# Patient Record
Sex: MALE | Race: WHITE | NOT HISPANIC OR LATINO | Employment: FULL TIME | URBAN - METROPOLITAN AREA
[De-identification: names, ages, dates, MRNs, and addresses within clinical notes are randomized per-mention and may not be internally consistent; named-entity substitution may affect disease eponyms.]

---

## 2018-05-20 ENCOUNTER — PATIENT MESSAGE (OUTPATIENT)
Dept: FAMILY MEDICINE | Facility: CLINIC | Age: 38
End: 2018-05-20

## 2018-05-21 ENCOUNTER — OFFICE VISIT (OUTPATIENT)
Dept: URGENT CARE | Facility: CLINIC | Age: 38
End: 2018-05-21
Payer: COMMERCIAL

## 2018-05-21 ENCOUNTER — PATIENT MESSAGE (OUTPATIENT)
Dept: FAMILY MEDICINE | Facility: CLINIC | Age: 38
End: 2018-05-21

## 2018-05-21 VITALS
WEIGHT: 220 LBS | BODY MASS INDEX: 31.5 KG/M2 | TEMPERATURE: 98 F | HEIGHT: 70 IN | SYSTOLIC BLOOD PRESSURE: 121 MMHG | DIASTOLIC BLOOD PRESSURE: 78 MMHG | HEART RATE: 78 BPM

## 2018-05-21 DIAGNOSIS — J02.9 SORE THROAT: ICD-10-CM

## 2018-05-21 DIAGNOSIS — J06.9 UPPER RESPIRATORY TRACT INFECTION, UNSPECIFIED TYPE: Primary | ICD-10-CM

## 2018-05-21 LAB
CTP QC/QA: YES
S PYO RRNA THROAT QL PROBE: NEGATIVE

## 2018-05-21 PROCEDURE — 3008F BODY MASS INDEX DOCD: CPT | Mod: CPTII,S$GLB,, | Performed by: NURSE PRACTITIONER

## 2018-05-21 PROCEDURE — 96372 THER/PROPH/DIAG INJ SC/IM: CPT | Mod: S$GLB,,, | Performed by: EMERGENCY MEDICINE

## 2018-05-21 PROCEDURE — 87880 STREP A ASSAY W/OPTIC: CPT | Mod: QW,S$GLB,, | Performed by: NURSE PRACTITIONER

## 2018-05-21 PROCEDURE — 99203 OFFICE O/P NEW LOW 30 MIN: CPT | Mod: 25,S$GLB,, | Performed by: NURSE PRACTITIONER

## 2018-05-21 RX ORDER — PROMETHAZINE HYDROCHLORIDE AND CODEINE PHOSPHATE 6.25; 1 MG/5ML; MG/5ML
5 SOLUTION ORAL EVERY 6 HOURS PRN
Qty: 120 ML | Refills: 0 | Status: SHIPPED | OUTPATIENT
Start: 2018-05-21 | End: 2018-05-31

## 2018-05-21 RX ORDER — BETAMETHASONE SODIUM PHOSPHATE AND BETAMETHASONE ACETATE 3; 3 MG/ML; MG/ML
6 INJECTION, SUSPENSION INTRA-ARTICULAR; INTRALESIONAL; INTRAMUSCULAR; SOFT TISSUE
Status: COMPLETED | OUTPATIENT
Start: 2018-05-21 | End: 2018-05-21

## 2018-05-21 RX ADMIN — BETAMETHASONE SODIUM PHOSPHATE AND BETAMETHASONE ACETATE 6 MG: 3; 3 INJECTION, SUSPENSION INTRA-ARTICULAR; INTRALESIONAL; INTRAMUSCULAR; SOFT TISSUE at 12:05

## 2018-05-21 NOTE — PROGRESS NOTES
"Subjective:       Patient ID: Juan Noel is a 37 y.o. male.    Vitals:  height is 5' 10" (1.778 m) and weight is 99.8 kg (220 lb). His temperature is 98.4 °F (36.9 °C). His blood pressure is 121/78 and his pulse is 78.     Chief Complaint: Sore Throat (started 4 days ago)    Started 4 days ago with sore throat and productive cough      Sore Throat    This is a new problem. The current episode started in the past 7 days. The problem has been gradually worsening. Neither side of throat is experiencing more pain than the other. There has been no fever. The pain is at a severity of 2/10. Associated symptoms include congestion, coughing, ear pain and headaches. Pertinent negatives include no abdominal pain, hoarse voice or shortness of breath. Treatments tried: expectorant. The treatment provided mild relief.     Review of Systems   Constitution: Positive for malaise/fatigue. Negative for chills and fever.   HENT: Positive for congestion, ear pain and sore throat. Negative for hoarse voice.    Eyes: Negative for discharge and redness.   Cardiovascular: Negative for chest pain, dyspnea on exertion and leg swelling.   Respiratory: Positive for cough and sputum production. Negative for shortness of breath and wheezing.         Yellow sputum   Musculoskeletal: Positive for myalgias.   Gastrointestinal: Negative for abdominal pain and nausea.   Neurological: Positive for headaches.       Objective:      Physical Exam   Constitutional: He is oriented to person, place, and time. He appears well-developed and well-nourished. He is cooperative.  Non-toxic appearance. He does not appear ill. No distress.   HENT:   Head: Normocephalic and atraumatic.   Right Ear: Hearing, tympanic membrane, external ear and ear canal normal.   Left Ear: Hearing, tympanic membrane, external ear and ear canal normal.   Nose: Nose normal. No mucosal edema, rhinorrhea or nasal deformity. No epistaxis. Right sinus exhibits no maxillary " sinus tenderness and no frontal sinus tenderness. Left sinus exhibits no maxillary sinus tenderness and no frontal sinus tenderness.   Mouth/Throat: Uvula is midline, oropharynx is clear and moist and mucous membranes are normal. No trismus in the jaw. Normal dentition. No uvula swelling. No posterior oropharyngeal erythema.   Eyes: Conjunctivae and lids are normal. No scleral icterus.   Sclera clear bilat   Neck: Trachea normal, full passive range of motion without pain and phonation normal. Neck supple.   Cardiovascular: Normal rate, regular rhythm, normal heart sounds, intact distal pulses and normal pulses.    Pulmonary/Chest: Effort normal and breath sounds normal. No respiratory distress.   Abdominal: Soft. Normal appearance and bowel sounds are normal. He exhibits no distension. There is no tenderness.   Musculoskeletal: Normal range of motion. He exhibits no edema or deformity.   Neurological: He is alert and oriented to person, place, and time. He exhibits normal muscle tone. Coordination normal.   Skin: Skin is warm, dry and intact. He is not diaphoretic. No pallor.   Psychiatric: He has a normal mood and affect. His speech is normal and behavior is normal. Judgment and thought content normal. Cognition and memory are normal.   Nursing note and vitals reviewed.      Assessment:       1. Upper respiratory tract infection, unspecified type    2. Sore throat        Plan:         Upper respiratory tract infection, unspecified type  -     betamethasone acetate-betamethasone sodium phosphate injection 6 mg; Inject 1 mL (6 mg total) into the muscle one time.  -     promethazine-codeine 6.25-10 mg/5 ml (PHENERGAN WITH CODEINE) 6.25-10 mg/5 mL syrup; Take 5 mLs by mouth every 6 (six) hours as needed for Cough.  Dispense: 120 mL; Refill: 0    Sore throat  -     POCT rapid strep A

## 2018-05-21 NOTE — PATIENT INSTRUCTIONS
Viral Upper Respiratory Illness (Adult)  You have a viral upper respiratory illness (URI), which is another term for the common cold. This illness is contagious during the first few days. It is spread through the air by coughing and sneezing. It may also be spread by direct contact (touching the sick person and then touching your own eyes, nose, or mouth). Frequent handwashing will decrease risk of spread. Most viral illnesses go away within 7 to 10 days with rest and simple home remedies. Sometimes the illness may last for several weeks. Antibiotics will not kill a virus, and they are generally not prescribed for this condition.    Home care  · If symptoms are severe, rest at home for the first 2 to 3 days. When you resume activity, don't let yourself get too tired.  · Avoid being exposed to cigarette smoke (yours or others).  · You may use acetaminophen or ibuprofen to control pain and fever, unless another medicine was prescribed. (Note: If you have chronic liver or kidney disease, have ever had a stomach ulcer or gastrointestinal bleeding, or are taking blood-thinning medicines, talk with your healthcare provider before using these medicines.) Aspirin should never be given to anyone under 18 years of age who is ill with a viral infection or fever. It may cause severe liver or brain damage.  · Your appetite may be poor, so a light diet is fine. Avoid dehydration by drinking 6 to 8 glasses of fluids per day (water, soft drinks, juices, tea, or soup). Extra fluids will help loosen secretions in the nose and lungs.  · Over-the-counter cold medicines will not shorten the length of time youre sick, but they may be helpful for the following symptoms: cough, sore throat, and nasal and sinus congestion. (Note: Do not use decongestants if you have high blood pressure.)  Follow-up care  Follow up with your healthcare provider, or as advised.  When to seek medical advice  Call your healthcare provider right away if any  of these occur:  · Cough with lots of colored sputum (mucus)  · Severe headache; face, neck, or ear pain  · Difficulty swallowing due to throat pain  · Fever of 100.4°F (38°C)  Call 911, or get immediate medical care  Call emergency services right away if any of these occur:  · Chest pain, shortness of breath, wheezing, or difficulty breathing  · Coughing up blood  · Inability to swallow due to throat pain  Date Last Reviewed: 9/13/2015  © 3566-0288 [employer]. [address] [place] [id number]. All rights reserved. This information is not intended as a substitute for professional medical care. Always follow your healthcare professional's instructions.

## 2018-10-15 ENCOUNTER — OFFICE VISIT (OUTPATIENT)
Dept: FAMILY MEDICINE | Facility: CLINIC | Age: 38
End: 2018-10-15
Payer: COMMERCIAL

## 2018-10-15 VITALS
OXYGEN SATURATION: 98 % | SYSTOLIC BLOOD PRESSURE: 140 MMHG | DIASTOLIC BLOOD PRESSURE: 88 MMHG | HEART RATE: 80 BPM | BODY MASS INDEX: 33.27 KG/M2 | HEIGHT: 70 IN | WEIGHT: 232.38 LBS | TEMPERATURE: 99 F

## 2018-10-15 DIAGNOSIS — Z23 NEED FOR INFLUENZA VACCINATION: ICD-10-CM

## 2018-10-15 DIAGNOSIS — G47.30 SLEEP APNEA, UNSPECIFIED TYPE: ICD-10-CM

## 2018-10-15 DIAGNOSIS — E78.6 HIGH-DENSITY LIPOPROTEIN DEFICIENCY: ICD-10-CM

## 2018-10-15 DIAGNOSIS — R79.89 ABNORMAL LIVER FUNCTION TESTS: ICD-10-CM

## 2018-10-15 DIAGNOSIS — Z00.00 ROUTINE MEDICAL EXAM: Primary | ICD-10-CM

## 2018-10-15 DIAGNOSIS — E78.5 HYPERLIPIDEMIA, UNSPECIFIED HYPERLIPIDEMIA TYPE: ICD-10-CM

## 2018-10-15 PROCEDURE — 90686 IIV4 VACC NO PRSV 0.5 ML IM: CPT | Mod: S$GLB,,, | Performed by: INTERNAL MEDICINE

## 2018-10-15 PROCEDURE — 99395 PREV VISIT EST AGE 18-39: CPT | Mod: 25,S$GLB,, | Performed by: INTERNAL MEDICINE

## 2018-10-15 PROCEDURE — 90471 IMMUNIZATION ADMIN: CPT | Mod: S$GLB,,, | Performed by: INTERNAL MEDICINE

## 2018-10-15 PROCEDURE — 99999 PR PBB SHADOW E&M-EST. PATIENT-LVL III: CPT | Mod: PBBFAC,,, | Performed by: INTERNAL MEDICINE

## 2018-10-15 NOTE — PROGRESS NOTES
CHIEF COMPLAINT:  Physical.                                                                                                                               HISTORY OF PRESENT ILLNESS:  A 38-year-old white male seen for his annual    physical.   Last seen 2016 .  We reviewed his prior labs.  His lipids a fluctuated over the years and his HDL is dropped.  His ALT has been persistently elevated with normal A1c and negative lab workup otherwise.  His father now has developed diabetes.  We discussed that he likely has an inherited these changes.  He has gained some weight back.  He now is the 2nd child.  Blood pressure slightly up.  His sister and father have hypertension so discussed monitoring.  He does have a monitor at home.  We confirmed  fatty liver with a liver ultrasound.                                                                                                                                                                                                ROS:   CONST: weight stable. EYES: no vision change. ENT: no sore throat. CV: no chest pain w/ exertion. RESP: no shortness of breath. GI: no nausea, vomiting, diarrhea. No dysphagia. : no urinary issues. MUSCULOSKELETAL: no new myalgias or arthralgias. SKIN: no new changes. NEURO: no focal deficits. PSYCH: no new issues. ENDOCRINE: no polyuria. HEME: no lymph nodes. ALLERGY: no general pruritis.                                                                                             PAST MEDICAL HISTORY:                                                        1.  Hyperlipidemia with LDL up to 164, low HDL 35                                       2.  Elevated ALT                                                             3.  Appendectomy 2004.                                                       4.  Rectal bleeding due to hemorrhoids per colon and rectal surgery.  5.  Mild TIERA 11/11 -no CPAP titration yet                                                                                      SOCIAL HISTORY:  Prior occasional smoking, social alcohol.  , with    4 children.   Travels to [place] at times for meetings for work                                                                                                                                          FAMILY HISTORY:  Maternal grandfather with prostate cancer.  Mom with        history of breast cancer.  Father with hypertension and chronic back         Problems and DIABETES.   Younger brother with no problems and an older sister with         hypertension that began in her 20s.                                                                                                                       PHYSICAL EXAMINATION:                                                        VITAL SIGNS: as above        GENERAL:  Pleasant male.                                                     HEENT:  Conjunctivae clear.  Pupils equal and reactive.  Nose and mouth      clear and moist.  Teeth good.                                                NECK:  Supple, but thick.  Thyroid nonpalpable.                              RESPIRATORY:  Effort is good.                                                LUNGS:  Clear.                                                               HEART:  Regular rate and rhythm without murmurs, gallops or rubs.  No        carotid bruits.  No edema.                                                   ABDOMEN:  Soft, nondistended, nontender.  No hepatosplenomegaly or masses.   SKIN:  No rashes.  Warm to touch.                                            EXTREMITIES:  Gait normal.  Digits without clubbing or cyanosis.                                                                                            Juan Barney was seen today for annual exam.    Diagnoses and all orders for this visit:    Routine medical exam, work on weight loss, update all blood work, flu shot today  -     Hemoglobin A1c; Future  -   "   Comprehensive metabolic panel; Future  -     Lipid panel; Future  -     CBC auto differential; Future  -     TSH; Future    Abnormal liver function tests    High-density lipoprotein deficiency    Sleep apnea, unspecified type    Hyperlipidemia, unspecified hyperlipidemia type    Other orders  -     Influenza - Quadrivalent (3 years & older) (PF)         Clinical note will be sensitive as documentation of weight related issues so as to not give patient reason to avoid follow-up"This note will not be shared with the patient."  "

## 2018-10-16 ENCOUNTER — LAB VISIT (OUTPATIENT)
Dept: LAB | Facility: OTHER | Age: 38
End: 2018-10-16
Payer: COMMERCIAL

## 2018-10-16 DIAGNOSIS — Z00.00 ROUTINE MEDICAL EXAM: ICD-10-CM

## 2018-10-16 LAB
ALBUMIN SERPL BCP-MCNC: 4.6 G/DL
ALP SERPL-CCNC: 50 U/L
ALT SERPL W/O P-5'-P-CCNC: 46 U/L
ANION GAP SERPL CALC-SCNC: 9 MMOL/L
AST SERPL-CCNC: 24 U/L
BASOPHILS # BLD AUTO: 0.03 K/UL
BASOPHILS NFR BLD: 0.4 %
BILIRUB SERPL-MCNC: 0.8 MG/DL
BUN SERPL-MCNC: 13 MG/DL
CALCIUM SERPL-MCNC: 9.6 MG/DL
CHLORIDE SERPL-SCNC: 105 MMOL/L
CHOLEST SERPL-MCNC: 199 MG/DL
CHOLEST/HDLC SERPL: 3.9 {RATIO}
CO2 SERPL-SCNC: 24 MMOL/L
CREAT SERPL-MCNC: 0.8 MG/DL
DIFFERENTIAL METHOD: ABNORMAL
EOSINOPHIL # BLD AUTO: 0.3 K/UL
EOSINOPHIL NFR BLD: 4.4 %
ERYTHROCYTE [DISTWIDTH] IN BLOOD BY AUTOMATED COUNT: 12.2 %
EST. GFR  (AFRICAN AMERICAN): >60 ML/MIN/1.73 M^2
EST. GFR  (NON AFRICAN AMERICAN): >60 ML/MIN/1.73 M^2
ESTIMATED AVG GLUCOSE: 97 MG/DL
GLUCOSE SERPL-MCNC: 102 MG/DL
HBA1C MFR BLD HPLC: 5 %
HCT VFR BLD AUTO: 43.6 %
HDLC SERPL-MCNC: 51 MG/DL
HDLC SERPL: 25.6 %
HGB BLD-MCNC: 14.9 G/DL
LDLC SERPL CALC-MCNC: 126.8 MG/DL
LYMPHOCYTES # BLD AUTO: 2.3 K/UL
LYMPHOCYTES NFR BLD: 32.1 %
MCH RBC QN AUTO: 32.4 PG
MCHC RBC AUTO-ENTMCNC: 34.2 G/DL
MCV RBC AUTO: 95 FL
MONOCYTES # BLD AUTO: 0.6 K/UL
MONOCYTES NFR BLD: 8.5 %
NEUTROPHILS # BLD AUTO: 4 K/UL
NEUTROPHILS NFR BLD: 54.3 %
NONHDLC SERPL-MCNC: 148 MG/DL
PLATELET # BLD AUTO: 257 K/UL
PMV BLD AUTO: 9.9 FL
POTASSIUM SERPL-SCNC: 4.6 MMOL/L
PROT SERPL-MCNC: 7.7 G/DL
RBC # BLD AUTO: 4.6 M/UL
SODIUM SERPL-SCNC: 138 MMOL/L
TRIGL SERPL-MCNC: 106 MG/DL
TSH SERPL DL<=0.005 MIU/L-ACNC: 1.84 UIU/ML
WBC # BLD AUTO: 7.29 K/UL

## 2018-10-16 PROCEDURE — 80053 COMPREHEN METABOLIC PANEL: CPT

## 2018-10-16 PROCEDURE — 85025 COMPLETE CBC W/AUTO DIFF WBC: CPT

## 2018-10-16 PROCEDURE — 80061 LIPID PANEL: CPT

## 2018-10-16 PROCEDURE — 84443 ASSAY THYROID STIM HORMONE: CPT

## 2018-10-16 PROCEDURE — 36415 COLL VENOUS BLD VENIPUNCTURE: CPT

## 2018-10-16 PROCEDURE — 83036 HEMOGLOBIN GLYCOSYLATED A1C: CPT

## 2019-05-17 ENCOUNTER — TELEPHONE (OUTPATIENT)
Dept: FAMILY MEDICINE | Facility: CLINIC | Age: 39
End: 2019-05-17

## 2019-05-17 ENCOUNTER — OFFICE VISIT (OUTPATIENT)
Dept: URGENT CARE | Facility: CLINIC | Age: 39
End: 2019-05-17
Payer: COMMERCIAL

## 2019-05-17 VITALS
TEMPERATURE: 102 F | SYSTOLIC BLOOD PRESSURE: 146 MMHG | WEIGHT: 230 LBS | OXYGEN SATURATION: 98 % | RESPIRATION RATE: 16 BRPM | DIASTOLIC BLOOD PRESSURE: 89 MMHG | HEART RATE: 109 BPM | HEIGHT: 70 IN | BODY MASS INDEX: 32.93 KG/M2

## 2019-05-17 DIAGNOSIS — R50.9 FEVER, UNSPECIFIED FEVER CAUSE: ICD-10-CM

## 2019-05-17 DIAGNOSIS — B34.9 VIRAL SYNDROME: Primary | ICD-10-CM

## 2019-05-17 LAB
CTP QC/QA: YES
FLUAV AG NPH QL: NEGATIVE
FLUBV AG NPH QL: NEGATIVE

## 2019-05-17 PROCEDURE — 99214 OFFICE O/P EST MOD 30 MIN: CPT | Mod: S$GLB,,, | Performed by: FAMILY MEDICINE

## 2019-05-17 PROCEDURE — 87804 POCT INFLUENZA A/B: ICD-10-PCS | Mod: 59,QW,S$GLB, | Performed by: FAMILY MEDICINE

## 2019-05-17 PROCEDURE — 87804 INFLUENZA ASSAY W/OPTIC: CPT | Mod: QW,S$GLB,, | Performed by: FAMILY MEDICINE

## 2019-05-17 PROCEDURE — 3008F PR BODY MASS INDEX (BMI) DOCUMENTED: ICD-10-PCS | Mod: CPTII,S$GLB,, | Performed by: FAMILY MEDICINE

## 2019-05-17 PROCEDURE — 3008F BODY MASS INDEX DOCD: CPT | Mod: CPTII,S$GLB,, | Performed by: FAMILY MEDICINE

## 2019-05-17 PROCEDURE — 99214 PR OFFICE/OUTPT VISIT, EST, LEVL IV, 30-39 MIN: ICD-10-PCS | Mod: S$GLB,,, | Performed by: FAMILY MEDICINE

## 2019-05-17 NOTE — PATIENT INSTRUCTIONS
"  Viral Syndrome (Adult)  A viral illness may cause a number of symptoms. The symptoms depend on the part of the body that the virus affects. If it settles in your nose, throat, and lungs, it may cause cough, sore throat, congestion, and sometimes headache. If it settles in your stomach and intestinal tract, it may cause vomiting and diarrhea. Sometimes it causes vague symptoms like "aching all over," feeling tired, loss of appetite, or fever.  A viral illness usually lasts 1 to 2 weeks, but sometimes it lasts longer. In some cases, a more serious infection can look like a viral syndrome in the first few days of the illness. You may need another exam and additional tests to know the difference. Watch for the warning signs listed below.  Home care  Follow these guidelines for taking care of yourself at home:  · If symptoms are severe, rest at home for the first 2 to 3 days.  · Stay away from cigarette smoke - both your smoke and the smoke from others.  · You may use over-the-counter acetaminophen or ibuprofen for fever, muscle aching, and headache, unless another medicine was prescribed for this. If you have chronic liver or kidney disease or ever had a stomach ulcer or GI bleeding, talk with your doctor before using these medicines. No one who is younger than 18 and ill with a fever should take aspirin. It may cause severe disease or death.  · Your appetite may be poor, so a light diet is fine. Avoid dehydration by drinking 8 to 12 8-ounce glasses of fluids each day. This may include water; orange juice; lemonade; apple, grape, and cranberry juice; clear fruit drinks; electrolyte replacement and sports drinks; and decaffeinated teas and coffee. If you have been diagnosed with a kidney disease, ask your doctor how much and what types of fluids you should drink to prevent dehydration. If you have kidney disease, drinking too much fluid can cause it build up in the your body and be dangerous to your " health.  · Over-the-counter remedies won't shorten the length of the illness but may be helpful for cough, sore throat; and nasal and sinus congestion. Don't use decongestants if you have high blood pressure.  Follow-up care  Follow up with your healthcare provider if you do not improve over the next week.  Call 911  Get emergency medical care if any of the following occur:  · Convulsion  · Feeling weak, dizzy, or like you are going to faint  · Chest pain, shortness of breath, wheezing, or difficulty breathing  When to seek medical advice  Call your healthcare provider right away if any of these occur:  · Cough with lots of colored sputum (mucus) or blood in your sputum  · Chest pain, shortness of breath, wheezing, or difficulty breathing  · Severe headache; face, neck, or ear pain  · Severe, constant pain in the lower right side of your belly (abdominal)  · Continued vomiting (cant keep liquids down)  · Frequent diarrhea (more than 5 times a day); blood (red or black color) or mucus in diarrhea  · Feeling weak, dizzy, or like you are going to faint  · Extreme thirst  · Fever of 100.4°F (38°C) or higher, or as directed by your healthcare provider  Date Last Reviewed: 9/25/2015  © 1171-9911 [employer]. [address] [place] [address], [place] [address]. All rights reserved. This information is not intended as a substitute for professional medical care. Always follow your healthcare professional's instructions.      IBUPROFEN 600 MG EVERY 6 HR AS NEEDED.    RECHECK MONDAY OR TUESDAY HERE OR WITH YOUR PCP WITH PERSISTENT FEVER AND/OR NOT FEELING BETTER.    Make sure that you follow up with your primary care doctor in the next 2-5 days if needed .  Return to the Urgent Care if signs or symptoms change and certainly if you have worsening symptoms go to the nearest emergency department for further evaluation.

## 2019-05-17 NOTE — PROGRESS NOTES
"Subjective:       Patient ID: Juan Noel is a 38 y.o. male.    Vitals:  height is 5' 10" (1.778 m) and weight is 104.3 kg (230 lb). His temperature is 101.9 °F (38.8 °C) (abnormal). His blood pressure is 146/89 (abnormal) and his pulse is 109. His respiration is 16 and oxygen saturation is 98%.     Chief Complaint: Fever    Pt c/o fever and body aches along with fatigue, pt has not taken any medications to alleviate symptoms.  Onset yesterday evening. He denies sore throat, congestion, sneezing or otalgia. He denies history of asthma or reactive airways.  No dysuria/frequency.  He does have several small children.    Fever    This is a new problem. The current episode started yesterday. The problem occurs intermittently. The problem has been gradually worsening. The maximum temperature noted was 101 to 101.9 F. The temperature was taken using an oral thermometer. Pertinent negatives include no chest pain, congestion, coughing, diarrhea, ear pain, headaches, nausea, rash, sore throat, urinary pain or vomiting. He has tried nothing for the symptoms.       Constitution: Positive for fatigue and fever. Negative for chills.   HENT: Negative for ear pain, hearing loss, facial swelling, congestion, sinus pain, sinus pressure and sore throat.    Neck: Negative for painful lymph nodes.   Cardiovascular: Negative for chest pain and leg swelling.   Eyes: Negative for double vision and blurred vision.   Respiratory: Negative for cough and shortness of breath.    Gastrointestinal: Negative for nausea, vomiting and diarrhea.   Genitourinary: Negative for dysuria, frequency and urgency.   Musculoskeletal: Positive for muscle ache. Negative for joint pain, joint swelling and muscle cramps.   Skin: Negative for color change, pale and rash.   Allergic/Immunologic: Negative for seasonal allergies.   Neurological: Negative for dizziness, history of vertigo, light-headedness, passing out and headaches. "   Hematologic/Lymphatic: Negative for swollen lymph nodes, easy bruising/bleeding and history of blood clots. Does not bruise/bleed easily.   Psychiatric/Behavioral: Negative for nervous/anxious, sleep disturbance and depression. The patient is not nervous/anxious.        Objective:      Physical Exam   Constitutional: He is oriented to person, place, and time. He appears well-developed and well-nourished. He is cooperative.  Non-toxic appearance. He does not appear ill. No distress.   HENT:   Head: Normocephalic and atraumatic.   Right Ear: Hearing, tympanic membrane, external ear and ear canal normal.   Left Ear: Hearing, tympanic membrane, external ear and ear canal normal.   Nose: Nose normal. No mucosal edema, rhinorrhea or nasal deformity. No epistaxis. Right sinus exhibits no maxillary sinus tenderness and no frontal sinus tenderness. Left sinus exhibits no maxillary sinus tenderness and no frontal sinus tenderness.   Mouth/Throat: Uvula is midline, oropharynx is clear and moist and mucous membranes are normal. No trismus in the jaw. Normal dentition. No uvula swelling. No oropharyngeal exudate or posterior oropharyngeal erythema.   Eyes: Pupils are equal, round, and reactive to light. Conjunctivae and lids are normal. Right eye exhibits no discharge. Left eye exhibits no discharge. No scleral icterus.   Sclera clear bilat   Neck: Trachea normal, normal range of motion, full passive range of motion without pain and phonation normal. Neck supple.   Cardiovascular: Normal rate, regular rhythm, normal heart sounds, intact distal pulses and normal pulses.   Pulmonary/Chest: Effort normal and breath sounds normal. No stridor. No respiratory distress. He has no wheezes. He has no rales.   Abdominal: Soft. Normal appearance and bowel sounds are normal. He exhibits no distension. There is no tenderness. There is no guarding.   Musculoskeletal: Normal range of motion. He exhibits no edema or deformity.    Lymphadenopathy:     He has no cervical adenopathy.   Neurological: He is alert and oriented to person, place, and time. He exhibits normal muscle tone. Coordination normal.   Skin: Skin is warm, dry and intact. He is not diaphoretic. No pallor.   Psychiatric: He has a normal mood and affect. His speech is normal and behavior is normal. Judgment and thought content normal. Cognition and memory are normal.   Nursing note and vitals reviewed.      Results for orders placed or performed in visit on 05/17/19   POCT Influenza A/B   Result Value Ref Range    Rapid Influenza A Ag Negative Negative    Rapid Influenza B Ag Negative Negative     Acceptable Yes      Assessment:       1. Viral syndrome    2. Fever, unspecified fever cause        Plan:         Viral syndrome    Fever, unspecified fever cause  -     POCT Influenza A/B      IBUPROFEN 600 MG EVERY 6 HR AS NEEDED.    RECHECK MONDAY OR TUESDAY HERE OR WITH YOUR PCP WITH PERSISTENT FEVER AND/OR NOT FEELING BETTER.    Make sure that you follow up with your primary care doctor in the next 2-5 days if needed .  Return to the Urgent Care if signs or symptoms change and certainly if you have worsening symptoms go to the nearest emergency department for further evaluation.

## 2019-05-17 NOTE — TELEPHONE ENCOUNTER
----- Message from Sharon Guardado sent at 5/17/2019 10:10 AM CDT -----  Contact: pt  Type: Patient Call Back    Who called:pt    What is the request in detail:pt has fever and body aches. Is it too late to take ramses flu? Call pt  Can the clinic reply by [facility]?    Would the patient rather a call back or a response via [facility]? Call back    Best call back number:[phone]    Additional Information:

## 2020-02-27 ENCOUNTER — PATIENT MESSAGE (OUTPATIENT)
Dept: FAMILY MEDICINE | Facility: CLINIC | Age: 40
End: 2020-02-27

## 2020-03-30 ENCOUNTER — CLINICAL SUPPORT (OUTPATIENT)
Dept: INTERNAL MEDICINE | Facility: CLINIC | Age: 40
End: 2020-03-30
Payer: COMMERCIAL

## 2020-03-30 ENCOUNTER — TELEPHONE (OUTPATIENT)
Dept: FAMILY MEDICINE | Facility: CLINIC | Age: 40
End: 2020-03-30

## 2020-03-30 DIAGNOSIS — R43.2 LOSS OF TASTE: ICD-10-CM

## 2020-03-30 DIAGNOSIS — B34.9 VIRAL SYNDROME: Primary | ICD-10-CM

## 2020-03-30 DIAGNOSIS — B34.9 VIRAL SYNDROME: ICD-10-CM

## 2020-03-30 PROCEDURE — U0002 COVID-19 LAB TEST NON-CDC: HCPCS

## 2020-03-30 NOTE — TELEPHONE ENCOUNTER
----- Message from Sharon Geo sent at 3/30/2020 12:10 PM CDT -----  Contact: pt  Type: Patient Call Back    Who called:pt    What is the request in detail:pt trying to make virtual appt. Schedule not allow. Pt has cough. Call pt    Can the clinic reply by [facility]?    Would the patient rather a call back or a response via [facility]? call    Best call back number:[phone]  Additional Information:

## 2020-03-30 NOTE — TELEPHONE ENCOUNTER
Patient can always be scheduled virtual visit this afternoon if he has that set up but I also did send him a [facility] message asking him some questions about symptoms so maybe he can send a message back on that issue

## 2020-03-31 ENCOUNTER — PATIENT MESSAGE (OUTPATIENT)
Dept: FAMILY MEDICINE | Facility: CLINIC | Age: 40
End: 2020-03-31

## 2020-03-31 LAB — SARS-COV-2 RNA RESP QL NAA+PROBE: NORMAL

## 2020-06-11 ENCOUNTER — TELEPHONE (OUTPATIENT)
Dept: FAMILY MEDICINE | Facility: CLINIC | Age: 40
End: 2020-06-11

## 2020-06-11 DIAGNOSIS — Z00.00 ROUTINE MEDICAL EXAM: Primary | ICD-10-CM

## 2020-07-06 ENCOUNTER — PATIENT MESSAGE (OUTPATIENT)
Dept: FAMILY MEDICINE | Facility: CLINIC | Age: 40
End: 2020-07-06

## 2020-07-07 NOTE — TELEPHONE ENCOUNTER
Looks like patient has a lab appointment for 7/22    I also added a COVID antibody, that may need to be scheduled as a separate lab appointment according to the lab?

## 2020-07-22 ENCOUNTER — LAB VISIT (OUTPATIENT)
Dept: LAB | Facility: OTHER | Age: 40
End: 2020-07-22
Attending: INTERNAL MEDICINE
Payer: COMMERCIAL

## 2020-07-22 ENCOUNTER — PATIENT MESSAGE (OUTPATIENT)
Dept: FAMILY MEDICINE | Facility: CLINIC | Age: 40
End: 2020-07-22

## 2020-07-22 DIAGNOSIS — Z00.00 ROUTINE MEDICAL EXAM: ICD-10-CM

## 2020-07-22 DIAGNOSIS — Z00.00 ROUTINE MEDICAL EXAM: Primary | ICD-10-CM

## 2020-07-22 LAB
ALBUMIN SERPL BCP-MCNC: 4.6 G/DL (ref 3.5–5.2)
ALP SERPL-CCNC: 53 U/L (ref 55–135)
ALT SERPL W/O P-5'-P-CCNC: 50 U/L (ref 10–44)
ANION GAP SERPL CALC-SCNC: 12 MMOL/L (ref 8–16)
AST SERPL-CCNC: 20 U/L (ref 10–40)
BASOPHILS # BLD AUTO: 0.05 K/UL (ref 0–0.2)
BASOPHILS NFR BLD: 0.7 % (ref 0–1.9)
BILIRUB SERPL-MCNC: 0.5 MG/DL (ref 0.1–1)
BUN SERPL-MCNC: 17 MG/DL (ref 6–20)
CALCIUM SERPL-MCNC: 9.3 MG/DL (ref 8.7–10.5)
CHLORIDE SERPL-SCNC: 106 MMOL/L (ref 95–110)
CHOLEST SERPL-MCNC: 210 MG/DL (ref 120–199)
CHOLEST/HDLC SERPL: 4.8 {RATIO} (ref 2–5)
CO2 SERPL-SCNC: 21 MMOL/L (ref 23–29)
CREAT SERPL-MCNC: 0.8 MG/DL (ref 0.5–1.4)
DIFFERENTIAL METHOD: ABNORMAL
EOSINOPHIL # BLD AUTO: 0.3 K/UL (ref 0–0.5)
EOSINOPHIL NFR BLD: 3.3 % (ref 0–8)
ERYTHROCYTE [DISTWIDTH] IN BLOOD BY AUTOMATED COUNT: 12.1 % (ref 11.5–14.5)
EST. GFR  (AFRICAN AMERICAN): >60 ML/MIN/1.73 M^2
EST. GFR  (NON AFRICAN AMERICAN): >60 ML/MIN/1.73 M^2
ESTIMATED AVG GLUCOSE: 103 MG/DL (ref 68–131)
GLUCOSE SERPL-MCNC: 101 MG/DL (ref 70–110)
HBA1C MFR BLD HPLC: 5.2 % (ref 4–5.6)
HCT VFR BLD AUTO: 45.1 % (ref 40–54)
HDLC SERPL-MCNC: 44 MG/DL (ref 40–75)
HDLC SERPL: 21 % (ref 20–50)
HGB BLD-MCNC: 15.3 G/DL (ref 14–18)
IMM GRANULOCYTES # BLD AUTO: 0.06 K/UL (ref 0–0.04)
IMM GRANULOCYTES NFR BLD AUTO: 0.8 % (ref 0–0.5)
LDLC SERPL CALC-MCNC: 140.8 MG/DL (ref 63–159)
LYMPHOCYTES # BLD AUTO: 2.5 K/UL (ref 1–4.8)
LYMPHOCYTES NFR BLD: 32.3 % (ref 18–48)
MCH RBC QN AUTO: 31.7 PG (ref 27–31)
MCHC RBC AUTO-ENTMCNC: 33.9 G/DL (ref 32–36)
MCV RBC AUTO: 94 FL (ref 82–98)
MONOCYTES # BLD AUTO: 0.7 K/UL (ref 0.3–1)
MONOCYTES NFR BLD: 8.6 % (ref 4–15)
NEUTROPHILS # BLD AUTO: 4.2 K/UL (ref 1.8–7.7)
NEUTROPHILS NFR BLD: 54.3 % (ref 38–73)
NONHDLC SERPL-MCNC: 166 MG/DL
NRBC BLD-RTO: 0 /100 WBC
PLATELET # BLD AUTO: 279 K/UL (ref 150–350)
PMV BLD AUTO: 9.9 FL (ref 9.2–12.9)
POTASSIUM SERPL-SCNC: 4.3 MMOL/L (ref 3.5–5.1)
PROT SERPL-MCNC: 7.5 G/DL (ref 6–8.4)
RBC # BLD AUTO: 4.82 M/UL (ref 4.6–6.2)
SODIUM SERPL-SCNC: 139 MMOL/L (ref 136–145)
TRIGL SERPL-MCNC: 126 MG/DL (ref 30–150)
TSH SERPL DL<=0.005 MIU/L-ACNC: 2.25 UIU/ML (ref 0.4–4)
WBC # BLD AUTO: 7.67 K/UL (ref 3.9–12.7)

## 2020-07-22 PROCEDURE — 85025 COMPLETE CBC W/AUTO DIFF WBC: CPT

## 2020-07-22 PROCEDURE — 83036 HEMOGLOBIN GLYCOSYLATED A1C: CPT

## 2020-07-22 PROCEDURE — 36415 COLL VENOUS BLD VENIPUNCTURE: CPT

## 2020-07-22 PROCEDURE — 80061 LIPID PANEL: CPT

## 2020-07-22 PROCEDURE — 84443 ASSAY THYROID STIM HORMONE: CPT

## 2020-07-22 PROCEDURE — 80053 COMPREHEN METABOLIC PANEL: CPT

## 2020-08-04 ENCOUNTER — PATIENT MESSAGE (OUTPATIENT)
Dept: FAMILY MEDICINE | Facility: CLINIC | Age: 40
End: 2020-08-04

## 2020-08-04 NOTE — TELEPHONE ENCOUNTER
Please contact to set labs- somehow prior covid Ab was NOT done so reordered w the testosterone level -please be sure BOTH set    thanks

## 2020-08-05 ENCOUNTER — PATIENT OUTREACH (OUTPATIENT)
Dept: ADMINISTRATIVE | Facility: HOSPITAL | Age: 40
End: 2020-08-05

## 2020-08-06 ENCOUNTER — LAB VISIT (OUTPATIENT)
Dept: LAB | Facility: OTHER | Age: 40
End: 2020-08-06
Attending: INTERNAL MEDICINE
Payer: COMMERCIAL

## 2020-08-06 DIAGNOSIS — Z00.00 ROUTINE MEDICAL EXAM: ICD-10-CM

## 2020-08-06 LAB
SARS-COV-2 IGG SERPLBLD QL IA.RAPID: NEGATIVE
TESTOST SERPL-MCNC: 504 NG/DL (ref 304–1227)

## 2020-08-06 PROCEDURE — 84403 ASSAY OF TOTAL TESTOSTERONE: CPT

## 2020-08-06 PROCEDURE — 86769 SARS-COV-2 COVID-19 ANTIBODY: CPT

## 2020-08-19 ENCOUNTER — OFFICE VISIT (OUTPATIENT)
Dept: FAMILY MEDICINE | Facility: CLINIC | Age: 40
End: 2020-08-19
Payer: COMMERCIAL

## 2020-08-19 VITALS
DIASTOLIC BLOOD PRESSURE: 76 MMHG | RESPIRATION RATE: 20 BRPM | OXYGEN SATURATION: 96 % | HEART RATE: 77 BPM | HEIGHT: 70 IN | TEMPERATURE: 98 F | SYSTOLIC BLOOD PRESSURE: 120 MMHG | WEIGHT: 230.81 LBS | BODY MASS INDEX: 33.04 KG/M2

## 2020-08-19 DIAGNOSIS — E78.6 HIGH-DENSITY LIPOPROTEIN DEFICIENCY: ICD-10-CM

## 2020-08-19 DIAGNOSIS — E78.5 HYPERLIPIDEMIA, UNSPECIFIED HYPERLIPIDEMIA TYPE: ICD-10-CM

## 2020-08-19 DIAGNOSIS — R07.9 CHEST PAIN, UNSPECIFIED TYPE: ICD-10-CM

## 2020-08-19 DIAGNOSIS — Z83.3 FAMILY HISTORY OF DIABETES MELLITUS: ICD-10-CM

## 2020-08-19 DIAGNOSIS — Z00.00 ROUTINE MEDICAL EXAM: Primary | ICD-10-CM

## 2020-08-19 DIAGNOSIS — G47.30 SLEEP APNEA, UNSPECIFIED TYPE: ICD-10-CM

## 2020-08-19 DIAGNOSIS — R79.89 ABNORMAL LIVER FUNCTION TESTS: ICD-10-CM

## 2020-08-19 PROCEDURE — 3008F BODY MASS INDEX DOCD: CPT | Mod: CPTII,S$GLB,, | Performed by: INTERNAL MEDICINE

## 2020-08-19 PROCEDURE — 3008F PR BODY MASS INDEX (BMI) DOCUMENTED: ICD-10-PCS | Mod: CPTII,S$GLB,, | Performed by: INTERNAL MEDICINE

## 2020-08-19 PROCEDURE — 99999 PR PBB SHADOW E&M-EST. PATIENT-LVL III: CPT | Mod: PBBFAC,,, | Performed by: INTERNAL MEDICINE

## 2020-08-19 PROCEDURE — 99396 PREV VISIT EST AGE 40-64: CPT | Mod: S$GLB,,, | Performed by: INTERNAL MEDICINE

## 2020-08-19 PROCEDURE — 99999 PR PBB SHADOW E&M-EST. PATIENT-LVL III: ICD-10-PCS | Mod: PBBFAC,,, | Performed by: INTERNAL MEDICINE

## 2020-08-19 PROCEDURE — 99396 PR PREVENTIVE VISIT,EST,40-64: ICD-10-PCS | Mod: S$GLB,,, | Performed by: INTERNAL MEDICINE

## 2020-08-19 NOTE — PROGRESS NOTES
CHIEF COMPLAINT:  Physical.                                                                                                                               HISTORY OF PRESENT ILLNESS:  A 39-year-old white male seen for his annual    physical.   .  We reviewed his prior labs.  His lipids a fluctuated over the years and his HDL is dropped.  His ALT has been persistently elevated with normal A1c and negative lab workup otherwise.  His father now has developed diabetes.  We discussed that he likely has an inherited these changes.  He has gained some weight back.  He now is the 2nd child.  Blood pressure slightly up.  His sister and father have hypertension so discussed monitoring.  He does have a monitor at home.  We confirmed  fatty liver with a liver ultrasound.                                                                                                                     We discussed the need to lose weight to treat fatty liver prevent diabetes    He has started playing tennis and is not doing stretching which we discussed.  A few weeks ago he had some tightness across both sides of the chest.  Was with exertion.  He has not had again with the same exertion and weeks.  We discussed his overall cardiac risk factors.  His LDL did rise just a little bit but that should be modifiable with weight loss and diet.  We did discuss that based on his exertional symptoms it would be justified to do a stress echo and he is agreeable                                                                               ROS:   CONST: weight stable. EYES: no vision change. ENT: no sore throat. CV: no chest pain w/ exertion. RESP: no shortness of breath. GI: no nausea, vomiting, diarrhea. No dysphagia. : no urinary issues. MUSCULOSKELETAL: no new myalgias or arthralgias. SKIN: no new changes. NEURO: no focal deficits. PSYCH: no new issues. ENDOCRINE: no polyuria. HEME: no lymph nodes. ALLERGY: no general pruritis.                                                                                              PAST MEDICAL HISTORY:                                                        1.  Hyperlipidemia with LDL up to 164, low HDL 35                                       2.  Elevated ALT                                                             3.  Appendectomy 2004.                                                       4.  Rectal bleeding due to hemorrhoids per colon and rectal surgery.  5.  Mild TIERA 11/11 -no CPAP titration yet                                                                                     SOCIAL HISTORY:  Prior occasional smoking, social alcohol.  , with    4 children.   Travels to [place] at times for meetings for work                                                                                                                                          FAMILY HISTORY:  Maternal grandfather with prostate cancer.  Mom with        history of breast cancer.  Father with hypertension and chronic back         Problems and DIABETES.   Younger brother with no problems and an older sister with         hypertension that began in her 20s.                                                                                                                       PHYSICAL EXAMINATION:                                                        VITAL SIGNS: as above        GENERAL:  Pleasant male.                                                     HEENT:  Conjunctivae clear.  Pupils equal and reactive.  Nose and mouth      clear and moist.  Teeth good.                                                NECK:  Supple, but thick.  Thyroid nonpalpable.                              RESPIRATORY:  Effort is good.                                                LUNGS:  Clear.                                                               HEART:  Regular rate and rhythm without murmurs, gallops or rubs.  No        carotid bruits.  No edema.                       "                             ABDOMEN:  Soft, nondistended, nontender.  No hepatosplenomegaly or masses.   SKIN:  No rashes.  Warm to touch.                                            EXTREMITIES:  Gait normal.  Digits without clubbing or cyanosis.                                                                                             Juan Barney was seen today for follow-up.    Diagnoses and all orders for this visit:    Routine medical exam, work on diet and weight loss    Abnormal liver function tests, fatty liver    High-density lipoprotein deficiency, improved    Sleep apnea, unspecified type    Hyperlipidemia, unspecified hyperlipidemia type, slight LDL increase    Family history of diabetes mellitus, fortunately normal A1c    Chest pain, unspecified type, somewhat exertional with increasing agents some risk factors, will do initial ischemic assessment with stress echo  -     Stress Echo Which stress agent will be used? Treadmill Exercise; Future         Clinical note will be sensitive as documentation of weight related issues so as to not give patient reason to avoid follow-up"This note will not be shared with the patient."  "

## 2020-09-24 ENCOUNTER — PATIENT MESSAGE (OUTPATIENT)
Dept: FAMILY MEDICINE | Facility: CLINIC | Age: 40
End: 2020-09-24

## 2020-09-24 NOTE — TELEPHONE ENCOUNTER
Looks like I ordered a stress echo on this patient back in August.  There has been somewhat of a problem with people being called about their stress test.  Can we please call the referral coordinators an ask who typically schedules the stress echoes.  I know the referral coordinators probably do not but we need to call whoever is responsible and find out what is going on.    Patient lives on the [place] will likely one his stress echo [place] the [place]

## 2020-09-25 ENCOUNTER — PATIENT MESSAGE (OUTPATIENT)
Dept: FAMILY MEDICINE | Facility: CLINIC | Age: 40
End: 2020-09-25

## 2020-10-07 ENCOUNTER — PATIENT MESSAGE (OUTPATIENT)
Dept: FAMILY MEDICINE | Facility: CLINIC | Age: 40
End: 2020-10-07

## 2020-10-14 ENCOUNTER — HOSPITAL ENCOUNTER (OUTPATIENT)
Dept: CARDIOLOGY | Facility: HOSPITAL | Age: 40
Discharge: HOME | End: 2020-10-14
Attending: INTERNAL MEDICINE
Payer: COMMERCIAL

## 2020-10-14 VITALS — HEIGHT: 70 IN | BODY MASS INDEX: 33.36 KG/M2 | WEIGHT: 233 LBS

## 2020-10-14 DIAGNOSIS — R07.9 CHEST PAIN, UNSPECIFIED TYPE: ICD-10-CM

## 2020-10-14 LAB
ASCENDING AORTA: 3.52 CM
BSA FOR ECHO PROCEDURE: 2.28 M2
CV ECHO LV RWT: 0.39 CM
CV STRESS BASE HR: 69 BPM
DIASTOLIC BLOOD PRESSURE: 88 MMHG
DOP CALC LVOT AREA: 4.2 CM2
DOP CALC LVOT DIAMETER: 2.31 CM
DOP CALC LVOT PEAK VEL: 0.97 M/S
DOP CALC LVOT STROKE VOLUME: 74.31 CM3
DOP CALCLVOT PEAK VEL VTI: 17.74 CM
E WAVE DECELERATION TIME: 163.06 MSEC
E/A RATIO: 1.22
E/E' RATIO: 7.88 M/S
ECHO LV POSTERIOR WALL: 0.91 CM (ref 0.6–1.1)
FRACTIONAL SHORTENING: 38 % (ref 28–44)
INTERVENTRICULAR SEPTUM: 0.88 CM (ref 0.6–1.1)
IVRT: 122.74 MSEC
LA MAJOR: 5.13 CM
LA MINOR: 5.36 CM
LA WIDTH: 3.63 CM
LEFT ATRIUM SIZE: 3.83 CM
LEFT ATRIUM VOLUME INDEX: 27.8 ML/M2
LEFT ATRIUM VOLUME: 61.95 CM3
LEFT INTERNAL DIMENSION IN SYSTOLE: 2.89 CM (ref 2.1–4)
LEFT VENTRICLE DIASTOLIC VOLUME INDEX: 44.48 ML/M2
LEFT VENTRICLE DIASTOLIC VOLUME: 99.07 ML
LEFT VENTRICLE MASS INDEX: 62 G/M2
LEFT VENTRICLE SYSTOLIC VOLUME INDEX: 14.3 ML/M2
LEFT VENTRICLE SYSTOLIC VOLUME: 31.84 ML
LEFT VENTRICULAR INTERNAL DIMENSION IN DIASTOLE: 4.63 CM (ref 3.5–6)
LEFT VENTRICULAR MASS: 138.18 G
LV LATERAL E/E' RATIO: 6.09 M/S
LV SEPTAL E/E' RATIO: 11.17 M/S
MV PEAK A VEL: 0.55 M/S
MV PEAK E VEL: 0.67 M/S
MV STENOSIS PRESSURE HALF TIME: 47.29 MS
MV VALVE AREA P 1/2 METHOD: 4.65 CM2
OHS CV CPX 1 MINUTE RECOVERY HEART RATE: 148 BPM
OHS CV CPX 85 PERCENT MAX PREDICTED HEART RATE MALE: 152
OHS CV CPX ESTIMATED METS: 15
OHS CV CPX MAX PREDICTED HEART RATE: 179
OHS CV CPX PATIENT IS FEMALE: 0
OHS CV CPX PATIENT IS MALE: 1
OHS CV CPX PEAK DIASTOLIC BLOOD PRESSURE: 56 MMHG
OHS CV CPX PEAK HEAR RATE: 181 BPM
OHS CV CPX PEAK RATE PRESSURE PRODUCT: NORMAL
OHS CV CPX PEAK SYSTOLIC BLOOD PRESSURE: 258 MMHG
OHS CV CPX PERCENT MAX PREDICTED HEART RATE ACHIEVED: 101
OHS CV CPX RATE PRESSURE PRODUCT PRESENTING: 9108
PISA TR MAX VEL: 2.21 M/S
PULM VEIN S/D RATIO: 0.79
PV PEAK D VEL: 0.42 M/S
PV PEAK S VEL: 0.33 M/S
RA MAJOR: 5.25 CM
RA PRESSURE: 3 MMHG
RA WIDTH: 3.22 CM
RIGHT VENTRICULAR END-DIASTOLIC DIMENSION: 3.86 CM
RV TISSUE DOPPLER FREE WALL SYSTOLIC VELOCITY 1 (APICAL 4 CHAMBER VIEW): 9.97 CM/S
SINUS: 3.85 CM
STJ: 3.17 CM
STRESS ECHO POST EXERCISE DUR MIN: 9 MINUTES
STRESS ECHO POST EXERCISE DUR SEC: 7 SECONDS
SYSTOLIC BLOOD PRESSURE: 132 MMHG
TDI LATERAL: 0.11 M/S
TDI SEPTAL: 0.06 M/S
TDI: 0.09 M/S
TR MAX PG: 20 MMHG
TRICUSPID ANNULAR PLANE SYSTOLIC EXCURSION: 2.6 CM
TV REST PULMONARY ARTERY PRESSURE: 23 MMHG

## 2020-10-14 PROCEDURE — 93351 STRESS ECHO (CUPID ONLY): ICD-10-PCS | Mod: 26,,, | Performed by: INTERNAL MEDICINE

## 2020-10-14 PROCEDURE — 93351 STRESS TTE COMPLETE: CPT | Mod: 26,,, | Performed by: INTERNAL MEDICINE

## 2020-10-14 PROCEDURE — 93351 STRESS TTE COMPLETE: CPT

## 2020-12-28 ENCOUNTER — PATIENT MESSAGE (OUTPATIENT)
Dept: FAMILY MEDICINE | Facility: CLINIC | Age: 40
End: 2020-12-28

## 2021-07-22 ENCOUNTER — TELEPHONE (OUTPATIENT)
Dept: FAMILY MEDICINE | Facility: CLINIC | Age: 41
End: 2021-07-22

## 2021-07-22 ENCOUNTER — PATIENT MESSAGE (OUTPATIENT)
Dept: FAMILY MEDICINE | Facility: CLINIC | Age: 41
End: 2021-07-22

## 2021-10-04 ENCOUNTER — TELEPHONE (OUTPATIENT)
Dept: FAMILY MEDICINE | Facility: CLINIC | Age: 41
End: 2021-10-04

## 2021-10-22 ENCOUNTER — OFFICE VISIT (OUTPATIENT)
Dept: FAMILY MEDICINE | Facility: CLINIC | Age: 41
End: 2021-10-22
Payer: COMMERCIAL

## 2021-10-22 VITALS
HEART RATE: 103 BPM | WEIGHT: 240.31 LBS | OXYGEN SATURATION: 97 % | BODY MASS INDEX: 34.4 KG/M2 | DIASTOLIC BLOOD PRESSURE: 80 MMHG | HEIGHT: 70 IN | TEMPERATURE: 99 F | SYSTOLIC BLOOD PRESSURE: 138 MMHG

## 2021-10-22 DIAGNOSIS — R79.89 ABNORMAL LIVER FUNCTION TESTS: ICD-10-CM

## 2021-10-22 DIAGNOSIS — Z12.5 SCREENING FOR PROSTATE CANCER: ICD-10-CM

## 2021-10-22 DIAGNOSIS — S39.012A LUMBOSACRAL STRAIN, INITIAL ENCOUNTER: ICD-10-CM

## 2021-10-22 DIAGNOSIS — Z83.3 FAMILY HISTORY OF DIABETES MELLITUS: ICD-10-CM

## 2021-10-22 DIAGNOSIS — Z00.00 ROUTINE MEDICAL EXAM: Primary | ICD-10-CM

## 2021-10-22 DIAGNOSIS — G47.30 SLEEP APNEA, UNSPECIFIED TYPE: ICD-10-CM

## 2021-10-22 DIAGNOSIS — E78.6 HIGH-DENSITY LIPOPROTEIN DEFICIENCY: ICD-10-CM

## 2021-10-22 PROCEDURE — 3079F PR MOST RECENT DIASTOLIC BLOOD PRESSURE 80-89 MM HG: ICD-10-PCS | Mod: CPTII,S$GLB,, | Performed by: INTERNAL MEDICINE

## 2021-10-22 PROCEDURE — 99999 PR PBB SHADOW E&M-EST. PATIENT-LVL III: CPT | Mod: PBBFAC,,, | Performed by: INTERNAL MEDICINE

## 2021-10-22 PROCEDURE — 99396 PR PREVENTIVE VISIT,EST,40-64: ICD-10-PCS | Mod: S$GLB,,, | Performed by: INTERNAL MEDICINE

## 2021-10-22 PROCEDURE — 3008F PR BODY MASS INDEX (BMI) DOCUMENTED: ICD-10-PCS | Mod: CPTII,S$GLB,, | Performed by: INTERNAL MEDICINE

## 2021-10-22 PROCEDURE — 3079F DIAST BP 80-89 MM HG: CPT | Mod: CPTII,S$GLB,, | Performed by: INTERNAL MEDICINE

## 2021-10-22 PROCEDURE — 3008F BODY MASS INDEX DOCD: CPT | Mod: CPTII,S$GLB,, | Performed by: INTERNAL MEDICINE

## 2021-10-22 PROCEDURE — 1159F MED LIST DOCD IN RCRD: CPT | Mod: CPTII,S$GLB,, | Performed by: INTERNAL MEDICINE

## 2021-10-22 PROCEDURE — 99999 PR PBB SHADOW E&M-EST. PATIENT-LVL III: ICD-10-PCS | Mod: PBBFAC,,, | Performed by: INTERNAL MEDICINE

## 2021-10-22 PROCEDURE — 1159F PR MEDICATION LIST DOCUMENTED IN MEDICAL RECORD: ICD-10-PCS | Mod: CPTII,S$GLB,, | Performed by: INTERNAL MEDICINE

## 2021-10-22 PROCEDURE — 3075F SYST BP GE 130 - 139MM HG: CPT | Mod: CPTII,S$GLB,, | Performed by: INTERNAL MEDICINE

## 2021-10-22 PROCEDURE — 3075F PR MOST RECENT SYSTOLIC BLOOD PRESS GE 130-139MM HG: ICD-10-PCS | Mod: CPTII,S$GLB,, | Performed by: INTERNAL MEDICINE

## 2021-10-22 PROCEDURE — 99396 PREV VISIT EST AGE 40-64: CPT | Mod: S$GLB,,, | Performed by: INTERNAL MEDICINE

## 2021-11-10 ENCOUNTER — LAB VISIT (OUTPATIENT)
Dept: LAB | Facility: OTHER | Age: 41
End: 2021-11-10
Payer: COMMERCIAL

## 2021-11-10 DIAGNOSIS — Z00.00 ROUTINE MEDICAL EXAM: ICD-10-CM

## 2021-11-10 LAB
ALBUMIN SERPL BCP-MCNC: 4.5 G/DL (ref 3.5–5.2)
ALP SERPL-CCNC: 47 U/L (ref 55–135)
ALT SERPL W/O P-5'-P-CCNC: 59 U/L (ref 10–44)
ANION GAP SERPL CALC-SCNC: 9 MMOL/L (ref 8–16)
AST SERPL-CCNC: 23 U/L (ref 10–40)
BASOPHILS # BLD AUTO: 0.04 K/UL (ref 0–0.2)
BASOPHILS NFR BLD: 0.6 % (ref 0–1.9)
BILIRUB SERPL-MCNC: 0.5 MG/DL (ref 0.1–1)
BUN SERPL-MCNC: 22 MG/DL (ref 6–20)
CALCIUM SERPL-MCNC: 9.6 MG/DL (ref 8.7–10.5)
CHLORIDE SERPL-SCNC: 108 MMOL/L (ref 95–110)
CHOLEST SERPL-MCNC: 209 MG/DL (ref 120–199)
CHOLEST/HDLC SERPL: 5 {RATIO} (ref 2–5)
CO2 SERPL-SCNC: 24 MMOL/L (ref 23–29)
COMPLEXED PSA SERPL-MCNC: 0.44 NG/ML (ref 0–4)
CREAT SERPL-MCNC: 0.9 MG/DL (ref 0.5–1.4)
DIFFERENTIAL METHOD: ABNORMAL
EOSINOPHIL # BLD AUTO: 0.3 K/UL (ref 0–0.5)
EOSINOPHIL NFR BLD: 3.7 % (ref 0–8)
ERYTHROCYTE [DISTWIDTH] IN BLOOD BY AUTOMATED COUNT: 12 % (ref 11.5–14.5)
EST. GFR  (AFRICAN AMERICAN): >60 ML/MIN/1.73 M^2
EST. GFR  (NON AFRICAN AMERICAN): >60 ML/MIN/1.73 M^2
ESTIMATED AVG GLUCOSE: 100 MG/DL (ref 68–131)
GLUCOSE SERPL-MCNC: 107 MG/DL (ref 70–110)
HBA1C MFR BLD: 5.1 % (ref 4–5.6)
HCT VFR BLD AUTO: 45.1 % (ref 40–54)
HDLC SERPL-MCNC: 42 MG/DL (ref 40–75)
HDLC SERPL: 20.1 % (ref 20–50)
HGB BLD-MCNC: 15.4 G/DL (ref 14–18)
IMM GRANULOCYTES # BLD AUTO: 0.04 K/UL (ref 0–0.04)
IMM GRANULOCYTES NFR BLD AUTO: 0.6 % (ref 0–0.5)
LDLC SERPL CALC-MCNC: 150.2 MG/DL (ref 63–159)
LYMPHOCYTES # BLD AUTO: 2.1 K/UL (ref 1–4.8)
LYMPHOCYTES NFR BLD: 31.3 % (ref 18–48)
MCH RBC QN AUTO: 32.5 PG (ref 27–31)
MCHC RBC AUTO-ENTMCNC: 34.1 G/DL (ref 32–36)
MCV RBC AUTO: 95 FL (ref 82–98)
MONOCYTES # BLD AUTO: 0.7 K/UL (ref 0.3–1)
MONOCYTES NFR BLD: 10.4 % (ref 4–15)
NEUTROPHILS # BLD AUTO: 3.6 K/UL (ref 1.8–7.7)
NEUTROPHILS NFR BLD: 53.4 % (ref 38–73)
NONHDLC SERPL-MCNC: 167 MG/DL
NRBC BLD-RTO: 0 /100 WBC
PLATELET # BLD AUTO: 277 K/UL (ref 150–450)
PMV BLD AUTO: 9.5 FL (ref 9.2–12.9)
POTASSIUM SERPL-SCNC: 4.6 MMOL/L (ref 3.5–5.1)
PROT SERPL-MCNC: 7.7 G/DL (ref 6–8.4)
RBC # BLD AUTO: 4.74 M/UL (ref 4.6–6.2)
SODIUM SERPL-SCNC: 141 MMOL/L (ref 136–145)
TRIGL SERPL-MCNC: 84 MG/DL (ref 30–150)
TSH SERPL DL<=0.005 MIU/L-ACNC: 1.21 UIU/ML (ref 0.4–4)
WBC # BLD AUTO: 6.74 K/UL (ref 3.9–12.7)

## 2021-11-10 PROCEDURE — 36415 COLL VENOUS BLD VENIPUNCTURE: CPT | Performed by: INTERNAL MEDICINE

## 2021-11-10 PROCEDURE — 84443 ASSAY THYROID STIM HORMONE: CPT | Performed by: INTERNAL MEDICINE

## 2021-11-10 PROCEDURE — 80061 LIPID PANEL: CPT | Performed by: INTERNAL MEDICINE

## 2021-11-10 PROCEDURE — 80053 COMPREHEN METABOLIC PANEL: CPT | Performed by: INTERNAL MEDICINE

## 2021-11-10 PROCEDURE — 84153 ASSAY OF PSA TOTAL: CPT | Performed by: INTERNAL MEDICINE

## 2021-11-10 PROCEDURE — 85025 COMPLETE CBC W/AUTO DIFF WBC: CPT | Performed by: INTERNAL MEDICINE

## 2021-11-10 PROCEDURE — 83036 HEMOGLOBIN GLYCOSYLATED A1C: CPT | Performed by: INTERNAL MEDICINE

## 2022-07-13 ENCOUNTER — PATIENT MESSAGE (OUTPATIENT)
Dept: FAMILY MEDICINE | Facility: CLINIC | Age: 42
End: 2022-07-13
Payer: COMMERCIAL

## 2022-07-13 DIAGNOSIS — Z00.00 ROUTINE MEDICAL EXAM: Primary | ICD-10-CM

## 2022-07-14 RX ORDER — VALSARTAN 80 MG/1
80 TABLET ORAL DAILY
Qty: 90 TABLET | Refills: 3 | Status: SHIPPED | OUTPATIENT
Start: 2022-07-14 | End: 2022-08-09 | Stop reason: SDUPTHER

## 2022-07-18 ENCOUNTER — PATIENT MESSAGE (OUTPATIENT)
Dept: FAMILY MEDICINE | Facility: CLINIC | Age: 42
End: 2022-07-18
Payer: COMMERCIAL

## 2022-08-09 ENCOUNTER — PATIENT MESSAGE (OUTPATIENT)
Dept: FAMILY MEDICINE | Facility: CLINIC | Age: 42
End: 2022-08-09
Payer: COMMERCIAL

## 2022-08-09 RX ORDER — VALSARTAN 160 MG/1
160 TABLET ORAL DAILY
Qty: 90 TABLET | Refills: 3 | Status: SHIPPED | OUTPATIENT
Start: 2022-08-09 | End: 2023-11-14 | Stop reason: SDUPTHER

## 2022-08-10 ENCOUNTER — LAB VISIT (OUTPATIENT)
Dept: LAB | Facility: HOSPITAL | Age: 42
End: 2022-08-10
Attending: INTERNAL MEDICINE
Payer: COMMERCIAL

## 2022-08-10 DIAGNOSIS — Z00.00 ROUTINE MEDICAL EXAM: ICD-10-CM

## 2022-08-10 LAB
ALBUMIN SERPL BCP-MCNC: 4.5 G/DL (ref 3.5–5.2)
ALP SERPL-CCNC: 50 U/L (ref 55–135)
ALT SERPL W/O P-5'-P-CCNC: 71 U/L (ref 10–44)
ANION GAP SERPL CALC-SCNC: 10 MMOL/L (ref 8–16)
AST SERPL-CCNC: 37 U/L (ref 10–40)
BASOPHILS # BLD AUTO: 0.05 K/UL (ref 0–0.2)
BASOPHILS NFR BLD: 0.7 % (ref 0–1.9)
BILIRUB SERPL-MCNC: 0.4 MG/DL (ref 0.1–1)
BUN SERPL-MCNC: 18 MG/DL (ref 6–20)
CALCIUM SERPL-MCNC: 9.9 MG/DL (ref 8.7–10.5)
CHLORIDE SERPL-SCNC: 103 MMOL/L (ref 95–110)
CHOLEST SERPL-MCNC: 207 MG/DL (ref 120–199)
CHOLEST/HDLC SERPL: 5.3 {RATIO} (ref 2–5)
CO2 SERPL-SCNC: 26 MMOL/L (ref 23–29)
CREAT SERPL-MCNC: 0.9 MG/DL (ref 0.5–1.4)
DIFFERENTIAL METHOD: ABNORMAL
EOSINOPHIL # BLD AUTO: 0.5 K/UL (ref 0–0.5)
EOSINOPHIL NFR BLD: 7.3 % (ref 0–8)
ERYTHROCYTE [DISTWIDTH] IN BLOOD BY AUTOMATED COUNT: 12.4 % (ref 11.5–14.5)
EST. GFR  (NO RACE VARIABLE): >60 ML/MIN/1.73 M^2
ESTIMATED AVG GLUCOSE: 100 MG/DL (ref 68–131)
GLUCOSE SERPL-MCNC: 89 MG/DL (ref 70–110)
HBA1C MFR BLD: 5.1 % (ref 4–5.6)
HCT VFR BLD AUTO: 45.6 % (ref 40–54)
HDLC SERPL-MCNC: 39 MG/DL (ref 40–75)
HDLC SERPL: 18.8 % (ref 20–50)
HGB BLD-MCNC: 15.2 G/DL (ref 14–18)
IMM GRANULOCYTES # BLD AUTO: 0.04 K/UL (ref 0–0.04)
IMM GRANULOCYTES NFR BLD AUTO: 0.6 % (ref 0–0.5)
LDLC SERPL CALC-MCNC: 126.8 MG/DL (ref 63–159)
LYMPHOCYTES # BLD AUTO: 2.1 K/UL (ref 1–4.8)
LYMPHOCYTES NFR BLD: 30.3 % (ref 18–48)
MCH RBC QN AUTO: 33 PG (ref 27–31)
MCHC RBC AUTO-ENTMCNC: 33.3 G/DL (ref 32–36)
MCV RBC AUTO: 99 FL (ref 82–98)
MONOCYTES # BLD AUTO: 0.9 K/UL (ref 0.3–1)
MONOCYTES NFR BLD: 13.6 % (ref 4–15)
NEUTROPHILS # BLD AUTO: 3.3 K/UL (ref 1.8–7.7)
NEUTROPHILS NFR BLD: 47.5 % (ref 38–73)
NONHDLC SERPL-MCNC: 168 MG/DL
NRBC BLD-RTO: 0 /100 WBC
PLATELET # BLD AUTO: 262 K/UL (ref 150–450)
PMV BLD AUTO: 10.6 FL (ref 9.2–12.9)
POTASSIUM SERPL-SCNC: 4.3 MMOL/L (ref 3.5–5.1)
PROT SERPL-MCNC: 7.8 G/DL (ref 6–8.4)
RBC # BLD AUTO: 4.6 M/UL (ref 4.6–6.2)
SODIUM SERPL-SCNC: 139 MMOL/L (ref 136–145)
T4 FREE SERPL-MCNC: 0.86 NG/DL (ref 0.71–1.51)
TRIGL SERPL-MCNC: 206 MG/DL (ref 30–150)
TSH SERPL DL<=0.005 MIU/L-ACNC: 2.16 UIU/ML (ref 0.4–4)
WBC # BLD AUTO: 6.86 K/UL (ref 3.9–12.7)

## 2022-08-10 PROCEDURE — 84439 ASSAY OF FREE THYROXINE: CPT | Performed by: INTERNAL MEDICINE

## 2022-08-10 PROCEDURE — 36415 COLL VENOUS BLD VENIPUNCTURE: CPT | Mod: PN | Performed by: INTERNAL MEDICINE

## 2022-08-10 PROCEDURE — 83036 HEMOGLOBIN GLYCOSYLATED A1C: CPT | Performed by: INTERNAL MEDICINE

## 2022-08-10 PROCEDURE — 84443 ASSAY THYROID STIM HORMONE: CPT | Performed by: INTERNAL MEDICINE

## 2022-08-10 PROCEDURE — 80061 LIPID PANEL: CPT | Performed by: INTERNAL MEDICINE

## 2022-08-10 PROCEDURE — 80053 COMPREHEN METABOLIC PANEL: CPT | Performed by: INTERNAL MEDICINE

## 2022-08-10 PROCEDURE — 85025 COMPLETE CBC W/AUTO DIFF WBC: CPT | Performed by: INTERNAL MEDICINE

## 2022-08-30 ENCOUNTER — PATIENT MESSAGE (OUTPATIENT)
Dept: FAMILY MEDICINE | Facility: CLINIC | Age: 42
End: 2022-08-30
Payer: COMMERCIAL

## 2022-09-01 RX ORDER — PREDNISONE 20 MG/1
TABLET ORAL
Qty: 18 TABLET | Refills: 0 | Status: SHIPPED | OUTPATIENT
Start: 2022-09-01 | End: 2022-12-07

## 2022-09-20 ENCOUNTER — PATIENT MESSAGE (OUTPATIENT)
Dept: FAMILY MEDICINE | Facility: CLINIC | Age: 42
End: 2022-09-20
Payer: COMMERCIAL

## 2022-09-20 DIAGNOSIS — H93.19 TINNITUS, UNSPECIFIED LATERALITY: Primary | ICD-10-CM

## 2022-09-20 DIAGNOSIS — R42 VERTIGO: ICD-10-CM

## 2022-09-20 DIAGNOSIS — R51.9 HEADACHE, UNSPECIFIED HEADACHE TYPE: ICD-10-CM

## 2022-09-21 ENCOUNTER — TELEPHONE (OUTPATIENT)
Dept: OTOLARYNGOLOGY | Facility: CLINIC | Age: 42
End: 2022-09-21
Payer: COMMERCIAL

## 2022-09-21 NOTE — TELEPHONE ENCOUNTER
----- Message from Aubrie Castellano sent at 9/21/2022  8:53 AM CDT -----  Regarding: Urgent referral  Good morning,     Please assist patient with scheduling urgent referrals placed by Dr. Castillo.        Thanks,

## 2022-09-21 NOTE — TELEPHONE ENCOUNTER
LVM in regards to a referral received calling to schedule an audiogram and appointment with Dr Lewis.

## 2022-09-26 NOTE — TELEPHONE ENCOUNTER
Juan Bird having some difficulty getting into the ENT department.  Might just be the scheduling process.  He is having some new tinnitus and new dizziness.  Some of the exercise from his messages back and forth with me are as below.  Could her staff assist in getting him in to see audiology and the appropriate ENT    Thanks, West      I'm hoping most of what I was experiencing was just anxiety.  Dizziness and being off-balanced  was part of it, and that has persisted to some degree, no nausea.  Last night I felt somewhat lightheaded or dizzy throughout the evening, and I still have the ringing in the left ear.     I got the Prednisone but felt better and didn't start it. Continued to feel better other than the ringing in my ear, still comes and goes.  I've had mild symptoms similar to after the initial episode, a little weaker/slower mentality.  This afternoon I had an episode similar to the initial night.   It was not as long but very intense and had me worried I was going to pass out.  The ringing in my ear got more intense leading up to the episode as it did previously.  I've had some minor headaches, not general but a mild in a specific location.   Its not constant but comes and goes

## 2022-09-28 ENCOUNTER — PATIENT MESSAGE (OUTPATIENT)
Dept: OTOLARYNGOLOGY | Facility: CLINIC | Age: 42
End: 2022-09-28
Payer: COMMERCIAL

## 2022-09-29 ENCOUNTER — CLINICAL SUPPORT (OUTPATIENT)
Dept: AUDIOLOGY | Facility: CLINIC | Age: 42
End: 2022-09-29
Payer: COMMERCIAL

## 2022-09-29 ENCOUNTER — OFFICE VISIT (OUTPATIENT)
Dept: OTOLARYNGOLOGY | Facility: CLINIC | Age: 42
End: 2022-09-29
Payer: COMMERCIAL

## 2022-09-29 VITALS — SYSTOLIC BLOOD PRESSURE: 140 MMHG | DIASTOLIC BLOOD PRESSURE: 83 MMHG | HEART RATE: 81 BPM

## 2022-09-29 DIAGNOSIS — R42 DIZZINESS AND GIDDINESS: ICD-10-CM

## 2022-09-29 DIAGNOSIS — H91.8X3 ASYMMETRICAL HEARING LOSS: Primary | ICD-10-CM

## 2022-09-29 DIAGNOSIS — Z77.122 HISTORY OF EXPOSURE TO NOISE: ICD-10-CM

## 2022-09-29 DIAGNOSIS — H93.12 TINNITUS, LEFT EAR: ICD-10-CM

## 2022-09-29 DIAGNOSIS — H90.3 ASYMMETRICAL SENSORINEURAL HEARING LOSS: Primary | ICD-10-CM

## 2022-09-29 DIAGNOSIS — H93.12 TINNITUS, LEFT: ICD-10-CM

## 2022-09-29 PROCEDURE — 92557 PR COMPREHENSIVE HEARING TEST: ICD-10-PCS | Mod: S$GLB,,, | Performed by: AUDIOLOGIST

## 2022-09-29 PROCEDURE — 1159F PR MEDICATION LIST DOCUMENTED IN MEDICAL RECORD: ICD-10-PCS | Mod: CPTII,S$GLB,, | Performed by: OTOLARYNGOLOGY

## 2022-09-29 PROCEDURE — 4010F PR ACE/ARB THEARPY RXD/TAKEN: ICD-10-PCS | Mod: CPTII,S$GLB,, | Performed by: OTOLARYNGOLOGY

## 2022-09-29 PROCEDURE — 99999 PR PBB SHADOW E&M-EST. PATIENT-LVL I: CPT | Mod: PBBFAC,,, | Performed by: AUDIOLOGIST

## 2022-09-29 PROCEDURE — 99999 PR PBB SHADOW E&M-EST. PATIENT-LVL III: ICD-10-PCS | Mod: PBBFAC,,, | Performed by: OTOLARYNGOLOGY

## 2022-09-29 PROCEDURE — 99203 PR OFFICE/OUTPT VISIT, NEW, LEVL III, 30-44 MIN: ICD-10-PCS | Mod: S$GLB,,, | Performed by: OTOLARYNGOLOGY

## 2022-09-29 PROCEDURE — 3044F PR MOST RECENT HEMOGLOBIN A1C LEVEL <7.0%: ICD-10-PCS | Mod: CPTII,S$GLB,, | Performed by: OTOLARYNGOLOGY

## 2022-09-29 PROCEDURE — 3044F HG A1C LEVEL LT 7.0%: CPT | Mod: CPTII,S$GLB,, | Performed by: OTOLARYNGOLOGY

## 2022-09-29 PROCEDURE — 1159F MED LIST DOCD IN RCRD: CPT | Mod: CPTII,S$GLB,, | Performed by: OTOLARYNGOLOGY

## 2022-09-29 PROCEDURE — 92567 PR TYMPA2METRY: ICD-10-PCS | Mod: S$GLB,,, | Performed by: AUDIOLOGIST

## 2022-09-29 PROCEDURE — 92557 COMPREHENSIVE HEARING TEST: CPT | Mod: S$GLB,,, | Performed by: AUDIOLOGIST

## 2022-09-29 PROCEDURE — 3077F SYST BP >= 140 MM HG: CPT | Mod: CPTII,S$GLB,, | Performed by: OTOLARYNGOLOGY

## 2022-09-29 PROCEDURE — 3077F PR MOST RECENT SYSTOLIC BLOOD PRESSURE >= 140 MM HG: ICD-10-PCS | Mod: CPTII,S$GLB,, | Performed by: OTOLARYNGOLOGY

## 2022-09-29 PROCEDURE — 4010F ACE/ARB THERAPY RXD/TAKEN: CPT | Mod: CPTII,S$GLB,, | Performed by: OTOLARYNGOLOGY

## 2022-09-29 PROCEDURE — 99999 PR PBB SHADOW E&M-EST. PATIENT-LVL I: ICD-10-PCS | Mod: PBBFAC,,, | Performed by: AUDIOLOGIST

## 2022-09-29 PROCEDURE — 99999 PR PBB SHADOW E&M-EST. PATIENT-LVL III: CPT | Mod: PBBFAC,,, | Performed by: OTOLARYNGOLOGY

## 2022-09-29 PROCEDURE — 3079F DIAST BP 80-89 MM HG: CPT | Mod: CPTII,S$GLB,, | Performed by: OTOLARYNGOLOGY

## 2022-09-29 PROCEDURE — 99203 OFFICE O/P NEW LOW 30 MIN: CPT | Mod: S$GLB,,, | Performed by: OTOLARYNGOLOGY

## 2022-09-29 PROCEDURE — 3079F PR MOST RECENT DIASTOLIC BLOOD PRESSURE 80-89 MM HG: ICD-10-PCS | Mod: CPTII,S$GLB,, | Performed by: OTOLARYNGOLOGY

## 2022-09-29 PROCEDURE — 92567 TYMPANOMETRY: CPT | Mod: S$GLB,,, | Performed by: AUDIOLOGIST
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
## 2022-09-29 NOTE — PATIENT INSTRUCTIONS
Audiometry reviewed  Hearing conservation encouraged prn  Tinnitus literature provided  Call for any significant change in otologic status  Consider neurology consultation pending course ( [phone])   Indications for prednisone discussed; no Rx ordered

## 2022-09-29 NOTE — PROGRESS NOTES
Juan Noel, a 42 y.o. male, was seen today in the clinic for an audiologic evaluation.  Patient's main complaint was tinnitus in the left ear.  Mr. Noel reported tinnitus started about three weeks ago with an episode of dizziness. Mr. Noel described tinnitus as a constant high frequency ringing that fluctuates in intensity. Patient reported he's had two episode of dizziness described as an overall feeling of imbalance in the last three weeks. Patient denied otalgia and vertigo.     Tympanometry revealed Type A in the right ear and Type A in the left ear. Audiogram results revealed essentially normal hearing sensitivity with a mild sensorineural hearing loss at 3000 and 4000 Hz in the right ear and normal hearing sensitivity sloping to moderately-severe sensorineural hearing loss in the left ear.  Speech reception thresholds were noted at 10 dB in the right ear and 5 dB in the left ear.  Speech discrimination scores were 100% in the right ear and 96% in the left ear.    Recommendations:  Otologic evaluation  Annual audiogram  Hearing protection when in noise  Hearing aid consultation with medical clearance

## 2022-10-17 ENCOUNTER — PATIENT MESSAGE (OUTPATIENT)
Dept: FAMILY MEDICINE | Facility: CLINIC | Age: 42
End: 2022-10-17
Payer: COMMERCIAL

## 2022-10-17 NOTE — TELEPHONE ENCOUNTER
Seven, take alook at the messages from Dr Noel's son Farnaz Wisdom constellation of symptoms and then send the message today about [facility] and exposure to a bat.    If you think some out checking for rabies is warranted or possible, let me know, perhaps you can get him in to see you if needed    Today's message    Dr. Castillo, I wanted to follow-up on my condition.  In general I haven't had another severe incident but have had a continuation of persistent symptoms (Tinnitus, heightened anxiety and mental weakness).  I saw an ENT and they didn't find any specific issue besides typ. hearing losses from hunting.  This weekend I noticed that I seem to have an increase in salivation.  It may have been after I googled rabies but it been warring on me since.  No encounters to be too concerned with but I did disposed of a bat in June at our house in [place].  We had an infestation and since had them removed.  It may not be warranted but if its an easy test it might put concern to rest.  I'm out of town tomorrow returning Wednesday. Let me know your thoughts

## 2022-12-06 ENCOUNTER — TELEPHONE (OUTPATIENT)
Dept: FAMILY MEDICINE | Facility: CLINIC | Age: 42
End: 2022-12-06
Payer: COMMERCIAL

## 2022-12-07 ENCOUNTER — OFFICE VISIT (OUTPATIENT)
Dept: FAMILY MEDICINE | Facility: CLINIC | Age: 42
End: 2022-12-07
Payer: COMMERCIAL

## 2022-12-07 VITALS
HEIGHT: 70 IN | BODY MASS INDEX: 33.89 KG/M2 | HEART RATE: 80 BPM | SYSTOLIC BLOOD PRESSURE: 122 MMHG | OXYGEN SATURATION: 96 % | WEIGHT: 236.75 LBS | DIASTOLIC BLOOD PRESSURE: 74 MMHG | TEMPERATURE: 98 F

## 2022-12-07 DIAGNOSIS — E78.5 HYPERLIPIDEMIA, UNSPECIFIED HYPERLIPIDEMIA TYPE: ICD-10-CM

## 2022-12-07 DIAGNOSIS — G47.30 SLEEP APNEA, UNSPECIFIED TYPE: ICD-10-CM

## 2022-12-07 DIAGNOSIS — F41.9 ANXIETY: ICD-10-CM

## 2022-12-07 DIAGNOSIS — R79.89 ABNORMAL LIVER FUNCTION TESTS: ICD-10-CM

## 2022-12-07 DIAGNOSIS — I10 HYPERTENSION, UNSPECIFIED TYPE: ICD-10-CM

## 2022-12-07 DIAGNOSIS — Z83.3 FAMILY HISTORY OF DIABETES MELLITUS: ICD-10-CM

## 2022-12-07 DIAGNOSIS — H93.19 TINNITUS, UNSPECIFIED LATERALITY: ICD-10-CM

## 2022-12-07 DIAGNOSIS — E78.6 HIGH-DENSITY LIPOPROTEIN DEFICIENCY: ICD-10-CM

## 2022-12-07 DIAGNOSIS — Z00.00 ROUTINE MEDICAL EXAM: Primary | ICD-10-CM

## 2022-12-07 PROCEDURE — 99396 PREV VISIT EST AGE 40-64: CPT | Mod: S$GLB,,, | Performed by: INTERNAL MEDICINE

## 2022-12-07 PROCEDURE — 99999 PR PBB SHADOW E&M-EST. PATIENT-LVL III: ICD-10-PCS | Mod: PBBFAC,,, | Performed by: INTERNAL MEDICINE

## 2022-12-07 PROCEDURE — 3008F BODY MASS INDEX DOCD: CPT | Mod: CPTII,S$GLB,, | Performed by: INTERNAL MEDICINE

## 2022-12-07 PROCEDURE — 3044F PR MOST RECENT HEMOGLOBIN A1C LEVEL <7.0%: ICD-10-PCS | Mod: CPTII,S$GLB,, | Performed by: INTERNAL MEDICINE

## 2022-12-07 PROCEDURE — 3008F PR BODY MASS INDEX (BMI) DOCUMENTED: ICD-10-PCS | Mod: CPTII,S$GLB,, | Performed by: INTERNAL MEDICINE

## 2022-12-07 PROCEDURE — 4010F PR ACE/ARB THEARPY RXD/TAKEN: ICD-10-PCS | Mod: CPTII,S$GLB,, | Performed by: INTERNAL MEDICINE

## 2022-12-07 PROCEDURE — 3078F PR MOST RECENT DIASTOLIC BLOOD PRESSURE < 80 MM HG: ICD-10-PCS | Mod: CPTII,S$GLB,, | Performed by: INTERNAL MEDICINE

## 2022-12-07 PROCEDURE — 99396 PR PREVENTIVE VISIT,EST,40-64: ICD-10-PCS | Mod: S$GLB,,, | Performed by: INTERNAL MEDICINE

## 2022-12-07 PROCEDURE — 3078F DIAST BP <80 MM HG: CPT | Mod: CPTII,S$GLB,, | Performed by: INTERNAL MEDICINE

## 2022-12-07 PROCEDURE — 99999 PR PBB SHADOW E&M-EST. PATIENT-LVL III: CPT | Mod: PBBFAC,,, | Performed by: INTERNAL MEDICINE

## 2022-12-07 PROCEDURE — 4010F ACE/ARB THERAPY RXD/TAKEN: CPT | Mod: CPTII,S$GLB,, | Performed by: INTERNAL MEDICINE

## 2022-12-07 PROCEDURE — 3044F HG A1C LEVEL LT 7.0%: CPT | Mod: CPTII,S$GLB,, | Performed by: INTERNAL MEDICINE

## 2022-12-07 PROCEDURE — 3074F SYST BP LT 130 MM HG: CPT | Mod: CPTII,S$GLB,, | Performed by: INTERNAL MEDICINE

## 2022-12-07 PROCEDURE — 3074F PR MOST RECENT SYSTOLIC BLOOD PRESSURE < 130 MM HG: ICD-10-PCS | Mod: CPTII,S$GLB,, | Performed by: INTERNAL MEDICINE

## 2022-12-07 RX ORDER — PROPRANOLOL HYDROCHLORIDE 10 MG/1
10 TABLET ORAL EVERY 6 HOURS PRN
Qty: 30 TABLET | Refills: 11 | Status: SHIPPED | OUTPATIENT
Start: 2022-12-07 | End: 2023-10-02

## 2022-12-07 NOTE — PROGRESS NOTES
CHIEF COMPLAINT:  Physical.                                                                                                                               HISTORY OF PRESENT ILLNESS:  A 42-year-old white male seen for his annual  physical.   .  We reviewed his prior labs.  His lipids a fluctuated over the years and his HDL has dropped with less exercise..  His ALT has been persistently elevated with normal A1c and negative lab workup otherwise.  His father now has developed diabetes.  We discussed that he likely has an inherited these changes.  He has gained some weight back.        Blood pressure has been up.  His sister and father have hypertension so discussed monitoring.  He does have a monitor at home.  We started him on valsartan 160 and he has been on that for about a month.  His readings at home in the morning only have been in the 130-140 range.  Here at the office it is perfect so will have him start checking in the afternoon and adjust the dose accordingly.    We also reviewed his episode where he woke up with some ringing in the left ear and at that time also had some numbness and tingling in the hands and some down the right leg.  Obviously got him anxious and he now looks back and thinks it might have been a panic attack.  He had one other episode of a similar anxiety type episode.  The ringing in the left ear has come and gone.  He has seen ENT and had some some expected hearing loss in that left ear.  He had some episodes of dizziness that might have been vertigo but nothing persistent.  Generally everything is getting better.  He did have some viral URI symptoms prior and we had discussed and offered prednisone but he had elected not to.  Symptoms generally getting better.  He does have some increased anxiety in the morning prior to meetings and so forth but not during the meetings and presentations.  He does note some memory loss more so in remembering names which he has never been great at  admittedly.  Some other concentration issues but no real loss of ability to function, no loss of cognitive function or difficulties with his work which can be complicated at times.  We discussed at length that he could have some increased anxiety referable to the illness and symptoms that he had previously.  We discussed an anxiety disorder and when the brain chemicals get out of balance enough to cause shortened emotional fuse is, poor concentration, poor short-term memory that all would be the time to treat.  Sounds like he is somewhat concerned about possibly having future panic attacks of which he has probably had about two of them.  We discussed keeping some low-dose Inderal 10 mg on hand port to use it ahead of time during those increase anxiety episodes prior.    Patient's physical symptoms obviously have him concerned about bigger issue such as stroke, brain aneurysm and so forth.  He has an acquaintance that is recovering from a brain aneurysm.  He has no family history of CNS aneurysm.  We discussed how there really is no pertinent screening criteria that would indicate a need for an MRI at this time and so forth and he would agree.  Also reassured that his physical symptoms of numbness tingling bring in the ear and so forth all would not be a symptom of stroke and more so indicative of panic attack possibly triggered by some physical symptom, probably had some viral labyrinthitis.      We confirmed  fatty liver with a liver ultrasound.                                                                                                                We discussed the need to lose weight to treat fatty liver prevent diabetes      Prior messages reviewed:  I wanted to follow-up on my condition.  In general I haven't had another severe incident but have had a continuation of persistent symptoms (Tinnitus, heightened anxiety and mental weakness).  I saw an ENT and they didn't find any specific issue besides typ.  hearing losses from hunting.  This weekend I noticed that I seem to have an increase in salivation.  It may have been after I googled rabies but it been warring on me since.  No encounters to be too concerned with but I did disposed of a bat in June at our house in [place].  We had an infestation and since had them removed                                                                                 ROS:   CONST: weight stable. EYES: no vision change. ENT: no sore throat. CV: no chest pain w/ exertion. RESP: no shortness of breath. GI: no nausea, vomiting, diarrhea. No dysphagia. : no urinary issues. MUSCULOSKELETAL: no new myalgias or arthralgias. SKIN: no new changes. NEURO: no focal deficits. PSYCH: no new issues. ENDOCRINE: no polyuria. HEME: no lymph nodes. ALLERGY: no general pruritis.                                                                                             PAST MEDICAL HISTORY:                                                        1.  Hyperlipidemia with LDL up to 164, low HDL 35                                       2.  Elevated ALT                                                             3.  Appendectomy 2004.                                                       4.  Rectal bleeding due to hemorrhoids per colon and rectal surgery.  5.  Mild TIERA 11/11 -no CPAP titration yet     6. THOMAS nl 10/2020                                                                                  SOCIAL HISTORY:  Prior occasional smoking, NO alcohol.  , with    4 children.   Travels to [place] at times for meetings for work                                                                                                                                          FAMILY HISTORY:  Maternal grandfather with prostate cancer.  Mom with        history of breast cancer.  Father with hypertension and chronic back         Problems and DIABETES.   Younger brother with no problems and an older sister with          hypertension that began in her 20s.                                                                                                                       PHYSICAL EXAMINATION:                                                        VITAL SIGNS: as above        Gen: no distress  EYES: conjunctiva clear, non-icteric, PERRL, extraocular movements intact, no nystagmus  ENT: nose clear, nasal mucosa normal, oropharynx clear and moist, teeth good, ear canals clear and tympanic membranes pearly,  NECK:supple, thyroid non-palpable  RESP: effort is good, lungs clear  CV: heart RRR w/o murmur, gallops or rubs; no carotid bruits, no edema  GI: abdomen soft, non-distended, non-tender, no hepatosplenomegaly  MS: gait normal, no clubbing or cyanosis of the digits,  SKIN: no rashes, warm to touch        Juan Barney was seen today for annual exam.    Diagnoses and all orders for this visit:    Routine medical exam, discussed weight loss, exercise, periodically lab work, colonoscopy at age 44    Tinnitus, unspecified laterality, seen ENT and he does not appear to have any progressive hearing loss, progressive vertigo or progressive headaches on that side was switch suggest or make us suspicious for an acoustic neuroma that would warrant MRI of the brain.    Abnormal liver function tests, fatty liver, work on weight loss, discussed how longstanding liver inflammation can lead to cirrhosis    High-density lipoprotein deficiency, fluctuates and probably relates to exercise level    Sleep apnea, unspecified type, mild on sleep study, occasionally awakening early in the morning and cannot go back to sleep which could be representative of the underlying anxiety level but could also be awakening from sleep apnea but he does not have any significant daytime somnolence.    Family history of diabetes mellitus    Hyperlipidemia, unspecified hyperlipidemia type, LDL acceptable    Hypertension, unspecified type, monitor more so at home so  that we can adjust accordingly    Anxiety, as needed Inderal, should overall anxiety level increase, become interfering with activities and function, then we would deem it and anxiety disorder and treat appropriately.    Other orders  -     propranoloL (INDERAL) 10 MG tablet; Take 1 tablet (10 mg total) by mouth every 6 (six) hours as needed (anxiety).

## 2023-03-23 ENCOUNTER — OFFICE VISIT (OUTPATIENT)
Dept: URGENT CARE | Facility: CLINIC | Age: 43
End: 2023-03-23
Payer: COMMERCIAL

## 2023-03-23 ENCOUNTER — PATIENT MESSAGE (OUTPATIENT)
Dept: FAMILY MEDICINE | Facility: CLINIC | Age: 43
End: 2023-03-23
Payer: COMMERCIAL

## 2023-03-23 VITALS
BODY MASS INDEX: 31.78 KG/M2 | HEART RATE: 84 BPM | RESPIRATION RATE: 18 BRPM | TEMPERATURE: 99 F | WEIGHT: 222 LBS | HEIGHT: 70 IN | OXYGEN SATURATION: 98 % | SYSTOLIC BLOOD PRESSURE: 128 MMHG | DIASTOLIC BLOOD PRESSURE: 82 MMHG

## 2023-03-23 DIAGNOSIS — J02.9 SORE THROAT: ICD-10-CM

## 2023-03-23 DIAGNOSIS — R05.9 COUGH, UNSPECIFIED TYPE: ICD-10-CM

## 2023-03-23 DIAGNOSIS — J02.9 VIRAL PHARYNGITIS: Primary | ICD-10-CM

## 2023-03-23 LAB
CTP QC/QA: YES
POC MOLECULAR INFLUENZA A AGN: NEGATIVE
POC MOLECULAR INFLUENZA B AGN: NEGATIVE

## 2023-03-23 PROCEDURE — 87502 INFLUENZA DNA AMP PROBE: CPT | Mod: QW,S$GLB,, | Performed by: NURSE PRACTITIONER

## 2023-03-23 PROCEDURE — 99203 PR OFFICE/OUTPT VISIT, NEW, LEVL III, 30-44 MIN: ICD-10-PCS | Mod: S$GLB,,, | Performed by: NURSE PRACTITIONER

## 2023-03-23 PROCEDURE — 87502 POCT INFLUENZA A/B MOLECULAR: ICD-10-PCS | Mod: QW,S$GLB,, | Performed by: NURSE PRACTITIONER

## 2023-03-23 PROCEDURE — 99203 OFFICE O/P NEW LOW 30 MIN: CPT | Mod: S$GLB,,, | Performed by: NURSE PRACTITIONER

## 2023-03-23 RX ORDER — BENZONATATE 100 MG/1
100 CAPSULE ORAL 3 TIMES DAILY PRN
Qty: 30 CAPSULE | Refills: 0 | Status: SHIPPED | OUTPATIENT
Start: 2023-03-23 | End: 2023-04-02

## 2023-03-23 RX ORDER — CETIRIZINE HYDROCHLORIDE 10 MG/1
10 TABLET ORAL DAILY
Qty: 30 TABLET | Refills: 0 | Status: SHIPPED | OUTPATIENT
Start: 2023-03-23 | End: 2023-04-22

## 2023-03-23 RX ORDER — FLUTICASONE PROPIONATE 50 MCG
2 SPRAY, SUSPENSION (ML) NASAL DAILY
Qty: 18.2 ML | Refills: 0 | Status: SHIPPED | OUTPATIENT
Start: 2023-03-23 | End: 2023-04-22

## 2023-03-23 NOTE — PROGRESS NOTES
"Subjective:       Patient ID: Juan Noel is a 42 y.o. male.    Vitals:  height is 5' 10" (1.778 m) and weight is 100.7 kg (222 lb). His temperature is 98.5 °F (36.9 °C). His blood pressure is 128/82 and his pulse is 84. His respiration is 18 and oxygen saturation is 98%.     Chief Complaint: Sore Throat (Need a Flu Test - Entered by patient)    41yo male pt reports sore throat, dry cough, mild chest congestion, and body aches that started last night have worsened today.  Reports that his daughter has been sick at home all week, reports that multiple classmates tested positive for flu A, daughter was not tested, but also has sore throat and body aches, flu-like symptoms.  Denies fever, reports chills.  Denies n/v/d, denies abd pain.  Denies chest pain, wheezing, or SOB.  Reports some pain with swallowing, denies difficulty swallowing liquids or solids.  Denies any other known sick contacts.  Reports COVID vaccination, denies flu vaccination.  Denies attempting any medications or treatments for symptoms.    Sore Throat   This is a new problem. The current episode started yesterday. The problem has been unchanged. Neither side of throat is experiencing more pain than the other. Associated symptoms include congestion and coughing. Pertinent negatives include no abdominal pain, diarrhea, ear pain, headaches, shortness of breath, trouble swallowing or vomiting. He has had no exposure to strep. He has tried nothing for the symptoms.     Constitution: Positive for chills. Negative for fever.   HENT:  Positive for congestion, postnasal drip and sore throat. Negative for ear pain and trouble swallowing.    Cardiovascular:  Negative for chest pain.   Respiratory:  Positive for cough. Negative for chest tightness, sputum production, COPD, shortness of breath, wheezing and asthma.    Gastrointestinal:  Negative for abdominal pain, nausea, vomiting and diarrhea.   Musculoskeletal:  Positive for muscle ache. "   Allergic/Immunologic: Negative for asthma.   Neurological:  Negative for headaches.     Objective:      Physical Exam   Constitutional: He is oriented to person, place, and time. He appears well-developed. He is cooperative.  Non-toxic appearance. He does not appear ill. No distress.   HENT:   Head: Normocephalic and atraumatic.   Ears:   Right Ear: Hearing, external ear and ear canal normal. Tympanic membrane is bulging. Tympanic membrane is not erythematous and not retracted. A middle ear effusion (clear fluid) is present.   Left Ear: Hearing, external ear and ear canal normal. Tympanic membrane is bulging. Tympanic membrane is not erythematous and not retracted. A middle ear effusion (clear fluid) is present.   Nose: Mucosal edema (erythema and mild swelling to BL turbinates) and rhinorrhea (clear to BL nares) present. No purulent discharge or nasal deformity. No epistaxis. Right sinus exhibits no maxillary sinus tenderness and no frontal sinus tenderness. Left sinus exhibits no maxillary sinus tenderness and no frontal sinus tenderness.   Mouth/Throat: Uvula is midline and mucous membranes are normal. No trismus in the jaw. Normal dentition. No uvula swelling. Oropharyngeal exudate (clear postnasal drip) and posterior oropharyngeal erythema (mild) present. No posterior oropharyngeal edema. Tonsils are 1+ on the right. Tonsils are 1+ on the left. No tonsillar exudate.   Eyes: Conjunctivae and lids are normal. No scleral icterus.   Neck: Trachea normal and phonation normal. Neck supple. No edema present. No erythema present. No neck rigidity present.   Cardiovascular: Normal rate, regular rhythm, normal heart sounds and normal pulses.   Pulmonary/Chest: Effort normal and breath sounds normal. No accessory muscle usage or stridor. No tachypnea. No respiratory distress. He has no decreased breath sounds. He has no wheezes. He has no rhonchi. He has no rales.   Abdominal: Normal appearance.   Musculoskeletal:  Normal range of motion.         General: No deformity. Normal range of motion.   Lymphadenopathy:        Head (right side): No submandibular adenopathy present.        Head (left side): No submandibular adenopathy present.     He has no cervical adenopathy.   Neurological: He is alert and oriented to person, place, and time. He exhibits normal muscle tone. Coordination normal.   Skin: Skin is warm, dry, intact, not diaphoretic and not pale.   Psychiatric: His speech is normal and behavior is normal. Judgment and thought content normal.   Nursing note and vitals reviewed.    Results for orders placed or performed in visit on 03/23/23   POCT Influenza A/B MOLECULAR   Result Value Ref Range    POC Molecular Influenza A Ag Negative Negative, Not Reported    POC Molecular Influenza B Ag Negative Negative, Not Reported     Acceptable Yes            Assessment:       1. Viral pharyngitis    2. Sore throat    3. Cough, unspecified type          Plan:       Provided education on prescribed medications, offered COVID testing, but pt stated that he would rather do an in-home test if symptoms do not improve.  Provided education on return/ER precautions.  Pt verbalized understanding and agreed to plan.      Viral pharyngitis  -     cetirizine (ZYRTEC) 10 MG tablet; Take 1 tablet (10 mg total) by mouth once daily.  Dispense: 30 tablet; Refill: 0  -     fluticasone propionate (FLONASE) 50 mcg/actuation nasal spray; 2 sprays (100 mcg total) by Each Nostril route once daily.  Dispense: 18.2 mL; Refill: 0  -     (Magic mouthwash) 1:1:1 diphenhydrAMINE(Benadryl) 12.5mg/5ml liq, aluminum & magnesium hydroxide-simethicone (Maalox), LIDOcaine viscous 2%; Swish and spit 10 mLs every 4 (four) hours as needed (throat pain).  Dispense: 360 mL; Refill: 0    Sore throat  -     POCT Influenza A/B MOLECULAR    Cough, unspecified type  -     benzonatate (TESSALON) 100 MG capsule; Take 1 capsule (100 mg total) by mouth 3 (three)  times daily as needed for Cough.  Dispense: 30 capsule; Refill: 0      Patient Instructions   If your condition worsens or fails to improve, we recommend that you receive another evaluation at the ER immediately, contact your PCP to discuss your concerns, or return here.  You must understand that you've received an urgent care treatment only, and that you may be released before all your medical problems are known or treated.  You, the patient, will arrange for followup care as instructed.     If the strep culture was done and returns negative in 3-5 days and you are still having a sore throat, you may need to get a mono spot test done or repeated.     Tylenol or Ibuprofen for pain may help as long as you are not allergic to these meds or have a medical condition (such as stomach ulcers, liver or kidney disease, or taking blood thinners, etc.) that would prevent you from using these medications. Rest and fluids will help as well. Anti-histamines, such as Claritin, Zyrtec, and Allegra, can help with reducing postnasal drip.  Flonase nasal spray can help with nasal/sinus congestion and postnasal drip as well.  Gargling with warm salt water and drinking hot tea or soups can help with alleviating pain.    If you were prescribed antibiotics and are female and on birth control pills, use additional methods to prevent pregnancy while on the antibiotics and for one cycle after.                Additional MDM:     Heart Failure Score:   COPD = No

## 2023-03-24 ENCOUNTER — PATIENT MESSAGE (OUTPATIENT)
Dept: FAMILY MEDICINE | Facility: CLINIC | Age: 43
End: 2023-03-24
Payer: COMMERCIAL

## 2023-03-24 RX ORDER — OSELTAMIVIR PHOSPHATE 75 MG/1
75 CAPSULE ORAL 2 TIMES DAILY
Qty: 10 CAPSULE | Refills: 0 | Status: SHIPPED | OUTPATIENT
Start: 2023-03-24 | End: 2023-03-29

## 2023-03-24 NOTE — TELEPHONE ENCOUNTER
Please advise,      West, I hope everythings going well. I came down with flu symptoms this afternoon but tested negative at the urgent care around noon.  My daughters had the flu all week and five of her friends from school so Im pretty sure thats what it is. I wanted to see if you thought I could take the flu medication or if I would need to get tested again. My fever started this afternoon, body aches and chest congestion.  The others tested positive for type A flu.  Let me know your thoughts and thanks for any help.

## 2023-09-11 ENCOUNTER — TELEPHONE (OUTPATIENT)
Dept: DERMATOLOGY | Facility: CLINIC | Age: 43
End: 2023-09-11
Payer: COMMERCIAL

## 2023-09-11 NOTE — TELEPHONE ENCOUNTER
----- Message from Kerri Coelho sent at 9/11/2023  8:25 AM CDT -----  Regarding: apt  Contact: [phone]  Pt calling in as np to schedule  apt for  skin check please call to discuss further

## 2023-09-20 DIAGNOSIS — I10 HYPERTENSION, UNSPECIFIED TYPE: ICD-10-CM

## 2023-09-27 DIAGNOSIS — I10 HYPERTENSION, UNSPECIFIED TYPE: ICD-10-CM

## 2023-10-02 ENCOUNTER — OFFICE VISIT (OUTPATIENT)
Dept: DERMATOLOGY | Facility: CLINIC | Age: 43
End: 2023-10-02
Payer: COMMERCIAL

## 2023-10-02 DIAGNOSIS — L57.0 AK (ACTINIC KERATOSIS): Primary | ICD-10-CM

## 2023-10-02 DIAGNOSIS — Z12.83 SCREENING EXAM FOR SKIN CANCER: ICD-10-CM

## 2023-10-02 DIAGNOSIS — D22.9 BENIGN NEVUS: ICD-10-CM

## 2023-10-02 DIAGNOSIS — L81.4 LENTIGO: ICD-10-CM

## 2023-10-02 PROCEDURE — 1159F MED LIST DOCD IN RCRD: CPT | Mod: CPTII,S$GLB,, | Performed by: DERMATOLOGY

## 2023-10-02 PROCEDURE — 17003 DESTRUCT PREMALG LES 2-14: CPT | Mod: S$GLB,,, | Performed by: DERMATOLOGY

## 2023-10-02 PROCEDURE — 1159F PR MEDICATION LIST DOCUMENTED IN MEDICAL RECORD: ICD-10-PCS | Mod: CPTII,S$GLB,, | Performed by: DERMATOLOGY

## 2023-10-02 PROCEDURE — 4010F PR ACE/ARB THEARPY RXD/TAKEN: ICD-10-PCS | Mod: CPTII,S$GLB,, | Performed by: DERMATOLOGY

## 2023-10-02 PROCEDURE — 99999 PR PBB SHADOW E&M-EST. PATIENT-LVL II: CPT | Mod: PBBFAC,,, | Performed by: DERMATOLOGY

## 2023-10-02 PROCEDURE — 1160F RVW MEDS BY RX/DR IN RCRD: CPT | Mod: CPTII,S$GLB,, | Performed by: DERMATOLOGY

## 2023-10-02 PROCEDURE — 1160F PR REVIEW ALL MEDS BY PRESCRIBER/CLIN PHARMACIST DOCUMENTED: ICD-10-PCS | Mod: CPTII,S$GLB,, | Performed by: DERMATOLOGY

## 2023-10-02 PROCEDURE — 99999 PR PBB SHADOW E&M-EST. PATIENT-LVL II: ICD-10-PCS | Mod: PBBFAC,,, | Performed by: DERMATOLOGY

## 2023-10-02 PROCEDURE — 4010F ACE/ARB THERAPY RXD/TAKEN: CPT | Mod: CPTII,S$GLB,, | Performed by: DERMATOLOGY

## 2023-10-02 PROCEDURE — 17000 PR DESTRUCTION(LASER SURGERY,CRYOSURGERY,CHEMOSURGERY),PREMALIGNANT LESIONS,FIRST LESION: ICD-10-PCS | Mod: S$GLB,,, | Performed by: DERMATOLOGY

## 2023-10-02 PROCEDURE — 17003 DESTRUCTION, PREMALIGNANT LESIONS; SECOND THROUGH 14 LESIONS: ICD-10-PCS | Mod: S$GLB,,, | Performed by: DERMATOLOGY

## 2023-10-02 PROCEDURE — 99203 OFFICE O/P NEW LOW 30 MIN: CPT | Mod: 25,S$GLB,, | Performed by: DERMATOLOGY

## 2023-10-02 PROCEDURE — 99203 PR OFFICE/OUTPT VISIT, NEW, LEVL III, 30-44 MIN: ICD-10-PCS | Mod: 25,S$GLB,, | Performed by: DERMATOLOGY

## 2023-10-02 PROCEDURE — 17000 DESTRUCT PREMALG LESION: CPT | Mod: S$GLB,,, | Performed by: DERMATOLOGY

## 2023-10-02 NOTE — PATIENT INSTRUCTIONS

## 2023-10-02 NOTE — PROGRESS NOTES
Subjective:      Patient ID:  Juan Noel is a 43 y.o. male who presents for   Chief Complaint   Patient presents with    Skin Check     Patient here for Upper Body Skin Exam    Last seen by dermatologist: As a teen    No - personal history of atypical moles removed  No - personal history of MM   No - family history of MM  Yes - childhood blistering sunburns  No - tanning bed use  No - personal history of NMSC    Patient with specific complaint of lesion(s)  Location: Back  Duration: 4 months  Symptoms: Inflamed  Relieving factors/Previous treatments: none        Review of Systems   Skin:  Positive for activity-related sunscreen use and wears hat (often). Negative for daily sunscreen use and recent sunburn.   Hematologic/Lymphatic: Does not bruise/bleed easily.       Objective:   Physical Exam   Constitutional: He appears well-developed and well-nourished. No distress.   Neurological: He is alert and oriented to person, place, and time. He is not disoriented.   Psychiatric: He has a normal mood and affect.   Skin:   Areas Examined (abnormalities noted in diagram):   Head / Face Inspection Performed  Neck Inspection Performed  Chest / Axilla Inspection Performed  Back Inspection Performed  RUE Inspected  LUE Inspection Performed                 Diagram Legend     Erythematous scaling macule/papule c/w actinic keratosis       Vascular papule c/w angioma      Pigmented verrucoid papule/plaque c/w seborrheic keratosis      Yellow umbilicated papule c/w sebaceous hyperplasia      Irregularly shaped tan macule c/w lentigo     1-2 mm smooth white papules consistent with Milia      Movable subcutaneous cyst with punctum c/w epidermal inclusion cyst      Subcutaneous movable cyst c/w pilar cyst      Firm pink to brown papule c/w dermatofibroma      Pedunculated fleshy papule(s) c/w skin tag(s)      Evenly pigmented macule c/w junctional nevus     Mildly variegated pigmented, slightly irregular-bordered macule  c/w mildly atypical nevus      Flesh colored to evenly pigmented papule c/w intradermal nevus       Pink pearly papule/plaque c/w basal cell carcinoma      Erythematous hyperkeratotic cursted plaque c/w SCC      Surgical scar with no sign of skin cancer recurrence      Open and closed comedones      Inflammatory papules and pustules      Verrucoid papule consistent consistent with wart     Erythematous eczematous patches and plaques     Dystrophic onycholytic nail with subungual debris c/w onychomycosis     Umbilicated papule    Erythematous-base heme-crusted tan verrucoid plaque consistent with inflamed seborrheic keratosis     Erythematous Silvery Scaling Plaque c/w Psoriasis     See annotation      Assessment / Plan:        AK (actinic keratosis)  Cryosurgery Procedure Note    Verbal consent from the patient is obtained including, but not limited to, risk of hypopigmentation/hyperpigmentation, scar, recurrence of lesion. The patient is aware of the precancerous quality and need for treatment of these lesions. Liquid nitrogen cryosurgery is applied to the 3 actinic keratoses, as detailed in the physical exam, to produce a freeze injury. The patient is aware that blisters may form and is instructed on wound care with gentle cleansing and use of vaseline ointment to keep moist until healed. The patient is supplied a handout on cryosurgery and is instructed to call if lesions do not completely resolve.      Benign nevus - mid lower back  Reassurance given to patient. No treatment is necessary.       Lentigo  This is a benign hyperpigmented sun induced lesion. Recommend daily sun protection/avoidance and use of at least SPF 30, broad spectrum sunscreen (OTC drug) will reduce the number of new lesions. Treatment of these benign lesions are considered cosmetic.      Screening exam for skin cancer  Area(s) of previous NMSC evaluated with no signs of recurrence.    Upper body skin examination performed today including at  least 6 points as noted in physical examination. No lesions suspicious for malignancy noted.    Recommend daily sun protection/avoidance and use of at least SPF 30, broad spectrum sunscreen (OTC drug).              Follow up in about 1 year (around 10/2/2024).

## 2023-10-04 DIAGNOSIS — I10 HYPERTENSION, UNSPECIFIED TYPE: ICD-10-CM

## 2023-10-12 ENCOUNTER — PATIENT MESSAGE (OUTPATIENT)
Dept: ADMINISTRATIVE | Facility: HOSPITAL | Age: 43
End: 2023-10-12
Payer: COMMERCIAL

## 2023-10-12 ENCOUNTER — PATIENT OUTREACH (OUTPATIENT)
Dept: ADMINISTRATIVE | Facility: HOSPITAL | Age: 43
End: 2023-10-12
Payer: COMMERCIAL

## 2023-10-12 DIAGNOSIS — E78.5 HYPERLIPIDEMIA, UNSPECIFIED HYPERLIPIDEMIA TYPE: Primary | ICD-10-CM

## 2023-10-18 ENCOUNTER — OFFICE VISIT (OUTPATIENT)
Dept: FAMILY MEDICINE | Facility: CLINIC | Age: 43
End: 2023-10-18
Payer: COMMERCIAL

## 2023-10-18 VITALS
SYSTOLIC BLOOD PRESSURE: 102 MMHG | WEIGHT: 212.06 LBS | OXYGEN SATURATION: 98 % | HEART RATE: 69 BPM | BODY MASS INDEX: 30.36 KG/M2 | TEMPERATURE: 98 F | DIASTOLIC BLOOD PRESSURE: 58 MMHG | HEIGHT: 70 IN

## 2023-10-18 DIAGNOSIS — F41.9 ANXIETY: ICD-10-CM

## 2023-10-18 DIAGNOSIS — Z12.5 SCREENING FOR PROSTATE CANCER: ICD-10-CM

## 2023-10-18 DIAGNOSIS — Z00.00 ROUTINE MEDICAL EXAM: Primary | ICD-10-CM

## 2023-10-18 DIAGNOSIS — E78.6 HIGH-DENSITY LIPOPROTEIN DEFICIENCY: ICD-10-CM

## 2023-10-18 DIAGNOSIS — H93.19 TINNITUS, UNSPECIFIED LATERALITY: ICD-10-CM

## 2023-10-18 DIAGNOSIS — Z83.3 FAMILY HISTORY OF DIABETES MELLITUS: ICD-10-CM

## 2023-10-18 DIAGNOSIS — E78.5 HYPERLIPIDEMIA, UNSPECIFIED HYPERLIPIDEMIA TYPE: ICD-10-CM

## 2023-10-18 DIAGNOSIS — G47.30 SLEEP APNEA, UNSPECIFIED TYPE: ICD-10-CM

## 2023-10-18 DIAGNOSIS — R42 VERTIGO: ICD-10-CM

## 2023-10-18 DIAGNOSIS — R79.89 ABNORMAL LIVER FUNCTION TESTS: ICD-10-CM

## 2023-10-18 DIAGNOSIS — I10 HYPERTENSION, UNSPECIFIED TYPE: ICD-10-CM

## 2023-10-18 PROCEDURE — 3074F SYST BP LT 130 MM HG: CPT | Mod: CPTII,S$GLB,, | Performed by: INTERNAL MEDICINE

## 2023-10-18 PROCEDURE — 99999 PR PBB SHADOW E&M-EST. PATIENT-LVL III: CPT | Mod: PBBFAC,,, | Performed by: INTERNAL MEDICINE

## 2023-10-18 PROCEDURE — 3008F BODY MASS INDEX DOCD: CPT | Mod: CPTII,S$GLB,, | Performed by: INTERNAL MEDICINE

## 2023-10-18 PROCEDURE — 99396 PR PREVENTIVE VISIT,EST,40-64: ICD-10-PCS | Mod: S$GLB,,, | Performed by: INTERNAL MEDICINE

## 2023-10-18 PROCEDURE — 1159F MED LIST DOCD IN RCRD: CPT | Mod: CPTII,S$GLB,, | Performed by: INTERNAL MEDICINE

## 2023-10-18 PROCEDURE — 3078F DIAST BP <80 MM HG: CPT | Mod: CPTII,S$GLB,, | Performed by: INTERNAL MEDICINE

## 2023-10-18 PROCEDURE — 3078F PR MOST RECENT DIASTOLIC BLOOD PRESSURE < 80 MM HG: ICD-10-PCS | Mod: CPTII,S$GLB,, | Performed by: INTERNAL MEDICINE

## 2023-10-18 PROCEDURE — 4010F ACE/ARB THERAPY RXD/TAKEN: CPT | Mod: CPTII,S$GLB,, | Performed by: INTERNAL MEDICINE

## 2023-10-18 PROCEDURE — 3074F PR MOST RECENT SYSTOLIC BLOOD PRESSURE < 130 MM HG: ICD-10-PCS | Mod: CPTII,S$GLB,, | Performed by: INTERNAL MEDICINE

## 2023-10-18 PROCEDURE — 1159F PR MEDICATION LIST DOCUMENTED IN MEDICAL RECORD: ICD-10-PCS | Mod: CPTII,S$GLB,, | Performed by: INTERNAL MEDICINE

## 2023-10-18 PROCEDURE — 99999 PR PBB SHADOW E&M-EST. PATIENT-LVL III: ICD-10-PCS | Mod: PBBFAC,,, | Performed by: INTERNAL MEDICINE

## 2023-10-18 PROCEDURE — 3008F PR BODY MASS INDEX (BMI) DOCUMENTED: ICD-10-PCS | Mod: CPTII,S$GLB,, | Performed by: INTERNAL MEDICINE

## 2023-10-18 PROCEDURE — 4010F PR ACE/ARB THEARPY RXD/TAKEN: ICD-10-PCS | Mod: CPTII,S$GLB,, | Performed by: INTERNAL MEDICINE

## 2023-10-18 PROCEDURE — 99396 PREV VISIT EST AGE 40-64: CPT | Mod: S$GLB,,, | Performed by: INTERNAL MEDICINE

## 2023-10-18 NOTE — PROGRESS NOTES
CHIEF COMPLAINT:  Physical.                                                                                                                               HISTORY OF PRESENT ILLNESS:  A 43-year-old white male seen for his annual  physical.   .  We reviewed his prior labs.  His lipids a fluctuated over the years and his HDL has dropped with less exercise..  His ALT has been persistently elevated with normal A1c and negative lab workup otherwise.  His father now has developed diabetes.  We discussed that he likely has an inherited these changes.  He is eating better, lost some weight and feeling better     He is wearing a monitor which can measure heart rate variability.  Apparently did record some episodes where his heart rate would go up to 175 for 15 or 20 minutes but he did not even notice it but it did not alert him.  We will try to get him to see if it can be set up to alert him if it recurs.  He had one episode in July, August and then October when he may actually have lost one device and got a new one in the interval for that month of September.  We discussed that if it recurs or symptomatic we might need to refer him to Cardiology to assess for some form of 30 day monitor or longer.  Either way he is asymptomatic.  It could be some form of monitoring problem but we did discuss the possibility of SVT and hopefully not atrial fibrillation.  He did have some mild sleep apnea on very remote testing we discussed the possibility of repeat home testing especially if he gets family to monitor for any witnessed apnea.  He does not have any excessive daytime somnolence.  He will also see if he get his device to record and measure his pulse ox and obviously will pursue workup if indeed he is getting nighttime desaturations.      Blood pressure has been good at home but not monitoring lately.  Today was 102/58 and he is asymptomatic and he will restart monitoring make sure he is not overtreated with weight loss..  His  sister and father have hypertension so discussed monitoring.  He does have a monitor at home.  We started him on valsartan 160 .  .    We also reviewed his episode where he woke up with some ringing in the left ear and at that time also had some numbness and tingling in the hands and some down the right leg.  Obviously got him anxious and he now looks back and thinks it might have been a panic attack.  He had one other episode of a similar anxiety type episode.  The ringing in the left ear has come and gone.  He has seen ENT and had some some expected hearing loss in that left ear.  He had some episodes of dizziness that might have been vertigo but nothing persistent.  Generally everything is getting better.  He did have some viral URI symptoms prior and we had discussed and offered prednisone but he had elected not to.  Symptoms generally getting better.  He does have some increased anxiety in the morning prior to meetings and so forth but not during the meetings and presentations.  He does note some memory loss more so in remembering names which he has never been great at admittedly.  Some other concentration issues but no real loss of ability to function, no loss of cognitive function or difficulties with his work which can be complicated at times.  We discussed at length that he could have some increased anxiety referable to the illness and symptoms that he had previously.  We discussed an anxiety disorder and when the brain chemicals get out of balance enough to cause shortened emotional fuse is, poor concentration, poor short-term memory that all would be the time to treat.  Sounds like he is somewhat concerned about possibly having future panic attacks of which he has probably had about two of them.  Previously We discussed keeping some low-dose Inderal 10 mg on hand  to use it ahead of time during those increase anxiety episodes prior.  He has not had any recurrences.  Occasionally when he gets anxious his  tinnitus will act up    Patient's physical symptoms obviously have him concerned about bigger issue such as stroke, brain aneurysm and so forth.  He has an acquaintance that is recovering from a brain aneurysm.  He has no family history of CNS aneurysm.  We discussed how there really is no pertinent screening criteria that would indicate a need for an MRI at this time and so forth and he would agree.  Also reassured that his physical symptoms of numbness tingling bring in the ear and so forth all would not be a symptom of stroke and more so indicative of panic attack possibly triggered by some physical symptom, probably had some viral labyrinthitis.      We confirmed  fatty liver with a liver ultrasound.                                                                                                                We discussed the need to lose weight to treat fatty liver prevent diabetes      Prior messages reviewed:  I wanted to follow-up on my condition.  In general I haven't had another severe incident but have had a continuation of persistent symptoms (Tinnitus, heightened anxiety and mental weakness).  I saw an ENT and they didn't find any specific issue besides typ. hearing losses from hunting.  This weekend I noticed that I seem to have an increase in salivation.  It may have been after I googled rabies but it been warring on me since.  No encounters to be too concerned with but I did disposed of a bat in June at our house in [place].  We had an infestation and since had them removed                                                                                 ROS:   CONST: weight stable. EYES: no vision change. ENT: no sore throat. CV: no chest pain w/ exertion. RESP: no shortness of breath. GI: no nausea, vomiting, diarrhea. No dysphagia. : no urinary issues. MUSCULOSKELETAL: no new myalgias or arthralgias. SKIN: no new changes. NEURO: no focal deficits. PSYCH: no new issues. ENDOCRINE: no polyuria. HEME: no  lymph nodes. ALLERGY: no general pruritis.                                                                                             PAST MEDICAL HISTORY:                                                        1.  Hyperlipidemia with LDL up to 164, low HDL 35                                       2.  Elevated ALT                                                             3.  Appendectomy 2004.                                                       4.  Rectal bleeding due to hemorrhoids per colon and rectal surgery.  5.  Mild TIERA 11/11 -no CPAP titration yet     6. THOMAS nl 10/2020                                                                                  SOCIAL HISTORY:  Prior occasional smoking, NO alcohol.  , with    4 children.   Travels to [place] at times for meetings for work                                                                                                                                          FAMILY HISTORY:  Maternal grandfather with prostate cancer.  Mom with        history of breast cancer.  Father with hypertension and chronic back         Problems and DIABETES.   Younger brother with no problems and an older sister with         hypertension that began in her 20s.                                                                                                                       PHYSICAL EXAMINATION:                                                        VITAL SIGNS: as above        Gen: no distress  EYES: conjunctiva clear, non-icteric, PERRL, extraocular movements intact, no nystagmus  ENT: nose clear, nasal mucosa normal, oropharynx clear and moist, teeth good, ear canals clear and tympanic membranes pearly,  NECK:supple, thyroid non-palpable  RESP: effort is good, lungs clear  CV: heart RRR w/o murmur, gallops or rubs; no carotid bruits, no edema  GI: abdomen soft, non-distended, non-tender, no hepatosplenomegaly  MS: gait normal, no clubbing or cyanosis of the  digits,  SKIN: no rashes, warm to touch        Juan Barney was seen today for hypertension.    Diagnoses and all orders for this visit:    Routine medical exam, continue with prostate cancer screening, colon cancer screening after 45.  Continue with exercise and weight loss.  Monitor for symptomatic sleep apnea symptoms.  Monitor and try to reduce cord any heart rate elevations and so forth and we will workup accordingly.  -     PSA, Screening; Future  -     Hemoglobin A1C; Future  -     Comprehensive Metabolic Panel; Future  -     TSH; Future  -     Lipid Panel; Future  -     CBC Auto Differential; Future    Screening for prostate cancer    Hypertension, unspecified type, restart checking at home    Hyperlipidemia, unspecified hyperlipidemia type, fluctuating over the years, reassess    Abnormal liver function tests, reassess    High-density lipoprotein deficiency, reassess    Family history of diabetes mellitus, continue screening, he does have signs of metabolic syndrome    Sleep apnea, unspecified type, monitor for ongoing symptoms    Anxiety, intermittent and does not appear to have any ongoing or progressive panic attack issues    Vertigo, no recent dizziness issues    Tinnitus, unspecified laterality, status post audiology evaluation, intermittent tinnitus may well clinically correlate with anxiety in this person

## 2023-10-19 ENCOUNTER — LAB VISIT (OUTPATIENT)
Dept: LAB | Facility: OTHER | Age: 43
End: 2023-10-19
Attending: INTERNAL MEDICINE
Payer: COMMERCIAL

## 2023-10-19 DIAGNOSIS — Z00.00 ROUTINE MEDICAL EXAM: ICD-10-CM

## 2023-10-19 LAB
ALBUMIN SERPL BCP-MCNC: 4.4 G/DL (ref 3.5–5.2)
ALP SERPL-CCNC: 46 U/L (ref 55–135)
ALT SERPL W/O P-5'-P-CCNC: 20 U/L (ref 10–44)
ANION GAP SERPL CALC-SCNC: 8 MMOL/L (ref 8–16)
AST SERPL-CCNC: 17 U/L (ref 10–40)
BASOPHILS # BLD AUTO: 0.03 K/UL (ref 0–0.2)
BASOPHILS NFR BLD: 0.5 % (ref 0–1.9)
BILIRUB SERPL-MCNC: 0.7 MG/DL (ref 0.1–1)
BUN SERPL-MCNC: 17 MG/DL (ref 6–20)
CALCIUM SERPL-MCNC: 9.7 MG/DL (ref 8.7–10.5)
CHLORIDE SERPL-SCNC: 106 MMOL/L (ref 95–110)
CHOLEST SERPL-MCNC: 169 MG/DL (ref 120–199)
CHOLEST/HDLC SERPL: 3.6 {RATIO} (ref 2–5)
CO2 SERPL-SCNC: 26 MMOL/L (ref 23–29)
COMPLEXED PSA SERPL-MCNC: 0.51 NG/ML (ref 0–4)
CREAT SERPL-MCNC: 1.1 MG/DL (ref 0.5–1.4)
DIFFERENTIAL METHOD: ABNORMAL
EOSINOPHIL # BLD AUTO: 0.2 K/UL (ref 0–0.5)
EOSINOPHIL NFR BLD: 3.1 % (ref 0–8)
ERYTHROCYTE [DISTWIDTH] IN BLOOD BY AUTOMATED COUNT: 12 % (ref 11.5–14.5)
EST. GFR  (NO RACE VARIABLE): >60 ML/MIN/1.73 M^2
ESTIMATED AVG GLUCOSE: 94 MG/DL (ref 68–131)
GLUCOSE SERPL-MCNC: 92 MG/DL (ref 70–110)
HBA1C MFR BLD: 4.9 % (ref 4–5.6)
HCT VFR BLD AUTO: 42.2 % (ref 40–54)
HDLC SERPL-MCNC: 47 MG/DL (ref 40–75)
HDLC SERPL: 27.8 % (ref 20–50)
HGB BLD-MCNC: 14.2 G/DL (ref 14–18)
IMM GRANULOCYTES # BLD AUTO: 0.08 K/UL (ref 0–0.04)
IMM GRANULOCYTES NFR BLD AUTO: 1.3 % (ref 0–0.5)
LDLC SERPL CALC-MCNC: 113.8 MG/DL (ref 63–159)
LYMPHOCYTES # BLD AUTO: 1.9 K/UL (ref 1–4.8)
LYMPHOCYTES NFR BLD: 31.3 % (ref 18–48)
MCH RBC QN AUTO: 32.3 PG (ref 27–31)
MCHC RBC AUTO-ENTMCNC: 33.6 G/DL (ref 32–36)
MCV RBC AUTO: 96 FL (ref 82–98)
MONOCYTES # BLD AUTO: 0.6 K/UL (ref 0.3–1)
MONOCYTES NFR BLD: 9.8 % (ref 4–15)
NEUTROPHILS # BLD AUTO: 3.4 K/UL (ref 1.8–7.7)
NEUTROPHILS NFR BLD: 54 % (ref 38–73)
NONHDLC SERPL-MCNC: 122 MG/DL
NRBC BLD-RTO: 0 /100 WBC
PLATELET # BLD AUTO: 242 K/UL (ref 150–450)
PMV BLD AUTO: 9.8 FL (ref 9.2–12.9)
POTASSIUM SERPL-SCNC: 5 MMOL/L (ref 3.5–5.1)
PROT SERPL-MCNC: 7.1 G/DL (ref 6–8.4)
RBC # BLD AUTO: 4.4 M/UL (ref 4.6–6.2)
SODIUM SERPL-SCNC: 140 MMOL/L (ref 136–145)
TRIGL SERPL-MCNC: 41 MG/DL (ref 30–150)
TSH SERPL DL<=0.005 MIU/L-ACNC: 1.4 UIU/ML (ref 0.4–4)
WBC # BLD AUTO: 6.2 K/UL (ref 3.9–12.7)

## 2023-10-19 PROCEDURE — 84443 ASSAY THYROID STIM HORMONE: CPT | Performed by: INTERNAL MEDICINE

## 2023-10-19 PROCEDURE — 36415 COLL VENOUS BLD VENIPUNCTURE: CPT | Performed by: INTERNAL MEDICINE

## 2023-10-19 PROCEDURE — 80061 LIPID PANEL: CPT | Performed by: INTERNAL MEDICINE

## 2023-10-19 PROCEDURE — 80053 COMPREHEN METABOLIC PANEL: CPT | Performed by: INTERNAL MEDICINE

## 2023-10-19 PROCEDURE — 84153 ASSAY OF PSA TOTAL: CPT | Performed by: INTERNAL MEDICINE

## 2023-10-19 PROCEDURE — 83036 HEMOGLOBIN GLYCOSYLATED A1C: CPT | Performed by: INTERNAL MEDICINE

## 2023-10-19 PROCEDURE — 85025 COMPLETE CBC W/AUTO DIFF WBC: CPT | Performed by: INTERNAL MEDICINE

## 2023-11-14 RX ORDER — VALSARTAN 160 MG/1
160 TABLET ORAL DAILY
Qty: 90 TABLET | Refills: 12 | Status: SHIPPED | OUTPATIENT
Start: 2023-11-14 | End: 2027-01-27

## 2024-08-02 ENCOUNTER — PATIENT OUTREACH (OUTPATIENT)
Dept: ADMINISTRATIVE | Facility: HOSPITAL | Age: 44
End: 2024-08-02
Payer: COMMERCIAL

## 2024-08-19 ENCOUNTER — TELEPHONE (OUTPATIENT)
Dept: PHARMACY | Facility: CLINIC | Age: 44
End: 2024-08-19
Payer: COMMERCIAL

## 2024-08-19 NOTE — TELEPHONE ENCOUNTER
[facility]/Population Health Chart Review & Patient Outreach Details For Medication Adherence Project    Reason for Outreach Encounter: 3rd Party payor non-compliance report ([employer], [employer], C, etc)    2.  Patient Outreach Method: Reviewed patient chart     3.   Medication in question:   Hypertension Medications               valsartan (DIOVAN) 160 MG tablet Take 1 tablet (160 mg total) by mouth once daily.               LAST FILLED: 6/30/24 for 90 day supply    4.  Reviewed and or Updates Made To: Patient Chart    5. Outreach Outcomes and/or actions taken: Patient filled medication and is on track to be adherent    Additional Notes:

## 2024-09-17 ENCOUNTER — PATIENT OUTREACH (OUTPATIENT)
Dept: ADMINISTRATIVE | Facility: HOSPITAL | Age: 44
End: 2024-09-17
Payer: COMMERCIAL

## 2024-09-17 DIAGNOSIS — Z12.11 ENCOUNTER FOR SCREENING FOR COLORECTAL MALIGNANT NEOPLASM: Primary | ICD-10-CM

## 2024-09-17 DIAGNOSIS — Z12.12 ENCOUNTER FOR SCREENING FOR COLORECTAL MALIGNANT NEOPLASM: Primary | ICD-10-CM

## 2024-10-21 ENCOUNTER — OFFICE VISIT (OUTPATIENT)
Dept: DERMATOLOGY | Facility: CLINIC | Age: 44
End: 2024-10-21
Payer: COMMERCIAL

## 2024-10-21 DIAGNOSIS — D22.9 BENIGN NEVUS: ICD-10-CM

## 2024-10-21 DIAGNOSIS — Z12.83 SCREENING EXAM FOR SKIN CANCER: Primary | ICD-10-CM

## 2024-10-21 DIAGNOSIS — L81.4 LENTIGO: ICD-10-CM

## 2024-10-21 DIAGNOSIS — L73.8 SEBACEOUS GLAND HYPERPLASIA: ICD-10-CM

## 2024-10-21 PROCEDURE — 99213 OFFICE O/P EST LOW 20 MIN: CPT | Mod: S$GLB,,, | Performed by: DERMATOLOGY

## 2024-10-21 PROCEDURE — 4010F ACE/ARB THERAPY RXD/TAKEN: CPT | Mod: CPTII,S$GLB,, | Performed by: DERMATOLOGY

## 2024-10-21 PROCEDURE — 1159F MED LIST DOCD IN RCRD: CPT | Mod: CPTII,S$GLB,, | Performed by: DERMATOLOGY

## 2024-10-21 PROCEDURE — 99999 PR PBB SHADOW E&M-EST. PATIENT-LVL II: CPT | Mod: PBBFAC,,, | Performed by: DERMATOLOGY

## 2024-10-21 PROCEDURE — 1160F RVW MEDS BY RX/DR IN RCRD: CPT | Mod: CPTII,S$GLB,, | Performed by: DERMATOLOGY

## 2024-10-21 NOTE — PROGRESS NOTES
Subjective:      Patient ID:  Juan Noel is a 44 y.o. male who presents for   Chief Complaint   Patient presents with    Skin Check     UBSE      History of Present Illness: The patient presents for follow up of skin check.    The patient was last seen on: 10/02/2023 for cryosurgery to actinic keratoses which have resolved.     Other skin complaints: none          Review of Systems   Skin:  Positive for activity-related sunscreen use (golfing , cutting grass), recent sunburn (mild. face and arms) and wears hat (often). Negative for itching, rash, dry skin and daily sunscreen use.   Hematologic/Lymphatic: Does not bruise/bleed easily.       Objective:   Physical Exam   Constitutional: He appears well-developed and well-nourished. No distress.   Neurological: He is alert and oriented to person, place, and time. He is not disoriented.   Psychiatric: He has a normal mood and affect.   Skin:   Areas Examined (abnormalities noted in diagram):   Head / Face Inspection Performed  Neck Inspection Performed  Chest / Axilla Inspection Performed  Back Inspection Performed  RUE Inspected  LUE Inspection Performed                 Diagram Legend     Erythematous scaling macule/papule c/w actinic keratosis       Vascular papule c/w angioma      Pigmented verrucoid papule/plaque c/w seborrheic keratosis      Yellow umbilicated papule c/w sebaceous hyperplasia      Irregularly shaped tan macule c/w lentigo     1-2 mm smooth white papules consistent with Milia      Movable subcutaneous cyst with punctum c/w epidermal inclusion cyst      Subcutaneous movable cyst c/w pilar cyst      Firm pink to brown papule c/w dermatofibroma      Pedunculated fleshy papule(s) c/w skin tag(s)      Evenly pigmented macule c/w junctional nevus     Mildly variegated pigmented, slightly irregular-bordered macule c/w mildly atypical nevus      Flesh colored to evenly pigmented papule c/w intradermal nevus       Pink pearly papule/plaque c/w  basal cell carcinoma      Erythematous hyperkeratotic cursted plaque c/w SCC      Surgical scar with no sign of skin cancer recurrence      Open and closed comedones      Inflammatory papules and pustules      Verrucoid papule consistent consistent with wart     Erythematous eczematous patches and plaques     Dystrophic onycholytic nail with subungual debris c/w onychomycosis     Umbilicated papule    Erythematous-base heme-crusted tan verrucoid plaque consistent with inflamed seborrheic keratosis     Erythematous Silvery Scaling Plaque c/w Psoriasis     See annotation      Assessment / Plan:        Screening exam for skin cancer  Upper body skin examination performed today including at least 6 points as noted in physical examination. No lesions suspicious for malignancy noted.    Recommend daily sun protection/avoidance and use of at least SPF 30, broad spectrum sunscreen (OTC drug).      Lentigo   - minor problem and chronic.   Reassurance given to patient. No treatment necessary.      Benign nevus   - minor problem and chronic.   Reassurance given to patient. No treatment necessary.      Sebaceous gland hyperplasia  This is a common condition representing benign enlargement of the sebaceous lobule. It typically occurs in adulthood. Reassurance given to patient.             Follow up in about 1 year (around 10/21/2025), or if symptoms worsen or fail to improve, for TBSE.

## 2024-10-23 ENCOUNTER — OFFICE VISIT (OUTPATIENT)
Dept: FAMILY MEDICINE | Facility: CLINIC | Age: 44
End: 2024-10-23
Payer: COMMERCIAL

## 2024-10-23 VITALS
WEIGHT: 227.94 LBS | OXYGEN SATURATION: 96 % | HEART RATE: 76 BPM | SYSTOLIC BLOOD PRESSURE: 100 MMHG | TEMPERATURE: 98 F | BODY MASS INDEX: 32.63 KG/M2 | HEIGHT: 70 IN | DIASTOLIC BLOOD PRESSURE: 56 MMHG

## 2024-10-23 DIAGNOSIS — R79.89 ABNORMAL LIVER FUNCTION TESTS: ICD-10-CM

## 2024-10-23 DIAGNOSIS — H93.19 TINNITUS, UNSPECIFIED LATERALITY: ICD-10-CM

## 2024-10-23 DIAGNOSIS — K64.9 HEMORRHOIDS, UNSPECIFIED HEMORRHOID TYPE: ICD-10-CM

## 2024-10-23 DIAGNOSIS — E78.6 HIGH-DENSITY LIPOPROTEIN DEFICIENCY: ICD-10-CM

## 2024-10-23 DIAGNOSIS — G47.30 SLEEP APNEA, UNSPECIFIED TYPE: ICD-10-CM

## 2024-10-23 DIAGNOSIS — Z00.00 ROUTINE MEDICAL EXAM: Primary | ICD-10-CM

## 2024-10-23 DIAGNOSIS — Z12.5 SCREENING FOR PROSTATE CANCER: ICD-10-CM

## 2024-10-23 DIAGNOSIS — I10 HYPERTENSION, UNSPECIFIED TYPE: ICD-10-CM

## 2024-10-23 DIAGNOSIS — R42 VERTIGO: ICD-10-CM

## 2024-10-23 DIAGNOSIS — F41.9 ANXIETY: ICD-10-CM

## 2024-10-23 DIAGNOSIS — Z12.12 SCREENING FOR COLORECTAL CANCER: ICD-10-CM

## 2024-10-23 DIAGNOSIS — E78.5 HYPERLIPIDEMIA, UNSPECIFIED HYPERLIPIDEMIA TYPE: ICD-10-CM

## 2024-10-23 DIAGNOSIS — Z83.3 FAMILY HISTORY OF DIABETES MELLITUS: ICD-10-CM

## 2024-10-23 DIAGNOSIS — Z12.11 SCREENING FOR COLORECTAL CANCER: ICD-10-CM

## 2024-10-23 PROCEDURE — 4010F ACE/ARB THERAPY RXD/TAKEN: CPT | Mod: CPTII,S$GLB,, | Performed by: INTERNAL MEDICINE

## 2024-10-23 PROCEDURE — 99999 PR PBB SHADOW E&M-EST. PATIENT-LVL III: CPT | Mod: PBBFAC,,, | Performed by: INTERNAL MEDICINE

## 2024-10-23 PROCEDURE — 3074F SYST BP LT 130 MM HG: CPT | Mod: CPTII,S$GLB,, | Performed by: INTERNAL MEDICINE

## 2024-10-23 PROCEDURE — 3008F BODY MASS INDEX DOCD: CPT | Mod: CPTII,S$GLB,, | Performed by: INTERNAL MEDICINE

## 2024-10-23 PROCEDURE — 1159F MED LIST DOCD IN RCRD: CPT | Mod: CPTII,S$GLB,, | Performed by: INTERNAL MEDICINE

## 2024-10-23 PROCEDURE — 99396 PREV VISIT EST AGE 40-64: CPT | Mod: S$GLB,,, | Performed by: INTERNAL MEDICINE

## 2024-10-23 PROCEDURE — 3078F DIAST BP <80 MM HG: CPT | Mod: CPTII,S$GLB,, | Performed by: INTERNAL MEDICINE

## 2024-10-23 RX ORDER — BUPROPION HYDROCHLORIDE 150 MG/1
150 TABLET ORAL DAILY
Qty: 30 TABLET | Refills: 11 | Status: SHIPPED | OUTPATIENT
Start: 2024-10-23 | End: 2025-10-23

## 2024-10-23 NOTE — PROGRESS NOTES
CHIEF COMPLAINT:  Physical.                                                                                                                               HISTORY OF PRESENT ILLNESS:  A 44-year-old white male seen for his annual  physical.   .  We reviewed his prior labs.  His lipids a fluctuated over the years and his HDL has dropped with less exercise..  His ALT has been persistently elevated with normal A1c and negative lab workup otherwise.  His father now has developed diabetes.  We discussed that he likely has an inherited these changes.  He is eating better, lost some weight and feeling better         Blood pressure has been good at home but not monitoring lately.  10/23 was 102/58 and he is asymptomatic and he will restart monitoring make sure he is not overtreated with weight loss..  Again today his blood pressure was 100/56 and asymptomatic and typically at home he says it was running in the 120s.  Will have him check when feeling normal and also when feeling is fatigue episodes as below.  His sister and father have hypertension so discussed monitoring.  He does have a monitor at home.  We started him on valsartan 160 .  No description of classic orthostasis. .    We also reviewed his episode where he woke up with some ringing in the left ear and at that time also had some numbness and tingling in the hands and some down the right leg.  Obviously got him anxious and he now looks back and thinks it might have been a panic attack.  He had one other episode of a similar anxiety type episode.  The ringing in the left ear has come and gone.  He has seen ENT and had some some expected hearing loss in that left ear.  He had some episodes of dizziness that might have been vertigo but nothing persistent.  Generally everything is getting better.  He did have some viral URI symptoms prior and we had discussed and offered prednisone but he had elected not to.  Symptoms generally getting better.  He does have some  increased anxiety in the morning prior to meetings and so forth but not during the meetings and presentations.  He does note some memory loss more so in remembering names which he has never been great at admittedly.  Some other concentration issues but no real loss of ability to function, no loss of cognitive function or difficulties with his work which can be complicated at times.  We discussed at length that he could have some increased anxiety referable to the illness and symptoms that he had previously.  We discussed an anxiety disorder and when the brain chemicals get out of balance enough to cause shortened emotional fuse is, poor concentration, poor short-term memory that all would be the time to treat.  Sounds like he is somewhat concerned about possibly having future panic attacks of which he has probably had about two of them.  Previously We discussed keeping some low-dose Inderal 10 mg on hand  to use it ahead of time during those increase anxiety episodes prior.  He has not had any recurrences.  Occasionally when he gets anxious his tinnitus will act up.      Patient continues to get some of these episodes where he will get the tinnitus and just feeling fatigued and has poor concentration.  Initially we discussed it could be some reactive hypoglycemia but does not appear to relate to that and actually can linger for days which would argue against any dietary factors.  He was wondering if it could be something dietary and should he go on a special elimination diet and so forth but it does not appear to really have any other GI symptoms that would suggest malabsorption, gluten and so forth.  He gets some occasional diarrhea with certain foods but no recurrent loose bowels, bloating, diarrhea, cramps.    He does report that during these episodes he is more anxious but in general even when not having the above symptoms does have some increase general anxiety level.  Having reviewed these symptoms over the  past year or two it does appear at this point that there could be some underlying elevated anxiety contributing to his symptoms, difficulty with concentration and so forth and it might be worth a try of Wellbutrin.  Discussed potential side effects.  Currently not having any sexual symptoms although one time in the past had difficulty with the orgasm but that is not a persistent problem and he was on no particular medications at that time.    Patient's physical symptoms obviously have him concerned about bigger issue such as stroke, brain aneurysm and so forth.  He has an acquaintance that is recovering from a brain aneurysm.  He has no family history of CNS aneurysm.  We discussed how there really is no pertinent screening criteria that would indicate a need for an MRI at this time and so forth and he would agree.  Also reassured that his physical symptoms of numbness tingling bring in the ear and so forth all would not be a symptom of stroke and more so indicative of panic attack possibly triggered by some physical symptom, probably had some viral labyrinthitis.      He is wearing a monitor which can measure heart rate variability.  Apparently did record some episodes where his heart rate would go up to 175 for 15 or 20 minutes but he did not even notice it but it did not alert him.  We will try to get him to see if it can be set up to alert him if it recurs.  He had one episode in July, August and then October when he may actually have lost one device and got a new one in the interval for that month of September 2023.  We discussed that if it recurs or symptomatic we might need to refer him to Cardiology to assess for some form of 30 day monitor or longer.  Either way he is asymptomatic.  It could be some form of monitoring problem but we did discuss the possibility of SVT and hopefully not atrial fibrillation.  Over the last year he has not recorded any issue with pulse elevation     He did have some mild sleep  apnea on very remote testing we discussed the possibility of repeat home testing especially if he gets family to monitor for any witnessed apnea.  He does not have any excessive daytime somnolence.  He will also see if he get his device to record and measure his pulse ox and obviously will pursue workup if indeed he is getting nighttime desaturations.      We confirmed  fatty liver with a liver ultrasound.    His weight is up somewhat but not near his max weight.  We will assess liver function at current weight.                                                                                                            We discussed the need to lose weight to treat fatty liver prevent diabetes      Prior messages reviewed:  I wanted to follow-up on my condition.  In general I haven't had another severe incident but have had a continuation of persistent symptoms (Tinnitus, heightened anxiety and mental weakness).  I saw an ENT and they didn't find any specific issue besides typ. hearing losses from hunting.  This weekend I noticed that I seem to have an increase in salivation.  It may have been after I googled rabies but it been warring on me since.  No encounters to be too concerned with but I did disposed of a bat in June at our house in [place].  We had an infestation and since had them removed                                                                                 ROS:   CONST: weight stable. EYES: no vision change. ENT: no sore throat. CV: no chest pain w/ exertion. RESP: no shortness of breath. GI: no nausea, vomiting, diarrhea. No dysphagia. : no urinary issues. MUSCULOSKELETAL: no new myalgias or arthralgias. SKIN: no new changes. NEURO: no focal deficits. PSYCH: no new issues. ENDOCRINE: no polyuria. HEME: no lymph nodes. ALLERGY: no general pruritis.                                                                                             PAST MEDICAL HISTORY:                                                         1.  Hyperlipidemia with LDL up to 164, low HDL 35                                       2.  Elevated ALT                                                             3.  Appendectomy 2004.                                                       4.  Rectal bleeding due to hemorrhoids per colon and rectal surgery.  5.  Mild TIERA 11/11 -no CPAP titration yet     6. THOMAS nl 10/2020                                                                                  SOCIAL HISTORY:  Prior occasional smoking, NO alcohol.  , with    4 children.   Travels to [place] at times for meetings for work                                                                                                                                          FAMILY HISTORY:  Maternal grandfather with prostate cancer.  Mom with        history of breast cancer.  Father with hypertension and chronic back         Problems and DIABETES.   Younger brother with no problems and an older sister with         hypertension that began in her 20s.                                                                                                                       PHYSICAL EXAMINATION:                                                        VITAL SIGNS: as above        Gen: no distress  EYES: conjunctiva clear, non-icteric, PERRL, extraocular movements intact, no nystagmus  ENT: nose clear, nasal mucosa normal, oropharynx clear and moist, teeth good, ear canals clear and tympanic membranes pearly,  NECK:supple, thyroid non-palpable  RESP: effort is good, lungs clear  CV: heart RRR w/o murmur, gallops or rubs; no carotid bruits, no edema  GI: abdomen soft, non-distended, non-tender, no hepatosplenomegaly  MS: gait normal, no clubbing or cyanosis of the digits,  SKIN: no rashes, warm to touch        Diagnoses and all orders for this visit:    Routine medical exam, appears he does have his phone call interview for colonoscopy set up.  Will update all blood work.   Work on weight loss  -     Hemoglobin A1C; Future  -     Comprehensive Metabolic Panel; Future  -     CBC Auto Differential; Future  -     PSA, Screening; Future  -     Lipid Panel; Future  -     TSH; Future  -     C-REACTIVE PROTEIN; Future  -     Sedimentation rate; Future  -     Testosterone; Future    Screening for prostate cancer  -     PSA, Screening; Future    Screening for colorectal cancer, set with colorectal surgery 1st    Hypertension, unspecified type, chronic and stable but restart monitoring to make sure not overtreated and assess blood pressure when feeling normal and when feeling different    Hyperlipidemia, unspecified hyperlipidemia type, reassess    Abnormal liver function tests, reassess    High-density lipoprotein deficiency, reassess    Family history of diabetes mellitus, check A1c    Sleep apnea, unspecified type, continue to monitor    Anxiety, overall patient may have an anxiety disorder based on review of his symptoms over the last year and general feeling of increased anxiety.  Will assess response to treating that potential underlying chemical imbalance and anxiety and we will start with a trial of Wellbutrin 150 mg.  Expectations and potential side effects discussed    Vertigo, note clear recurrence of vertigo    Tinnitus, unspecified laterality, appears to occur with other feelings of increased anxiety and fatigue    Hemorrhoids, unspecified hemorrhoid type, patient reports some significant ongoing hemorrhoid issues and so will have him see colorectal surgery 1st and they can arrange his colonoscopy and treatment if needed  -     Ambulatory referral/consult to Colorectal Surgery; Future    Other orders  -     buPROPion (WELLBUTRIN XL) 150 MG TB24 tablet; Take 1 tablet (150 mg total) by mouth once daily.

## 2024-10-24 ENCOUNTER — LAB VISIT (OUTPATIENT)
Dept: LAB | Facility: OTHER | Age: 44
End: 2024-10-24
Attending: INTERNAL MEDICINE
Payer: COMMERCIAL

## 2024-10-24 DIAGNOSIS — Z00.00 ROUTINE MEDICAL EXAM: ICD-10-CM

## 2024-10-24 DIAGNOSIS — Z12.5 SCREENING FOR PROSTATE CANCER: ICD-10-CM

## 2024-10-24 LAB
ALBUMIN SERPL BCP-MCNC: 4.2 G/DL (ref 3.5–5.2)
ALP SERPL-CCNC: 47 U/L (ref 40–150)
ALT SERPL W/O P-5'-P-CCNC: 27 U/L (ref 10–44)
ANION GAP SERPL CALC-SCNC: 8 MMOL/L (ref 8–16)
AST SERPL-CCNC: 16 U/L (ref 10–40)
BASOPHILS # BLD AUTO: 0.04 K/UL (ref 0–0.2)
BASOPHILS NFR BLD: 0.6 % (ref 0–1.9)
BILIRUB SERPL-MCNC: 0.4 MG/DL (ref 0.1–1)
BUN SERPL-MCNC: 21 MG/DL (ref 6–20)
CALCIUM SERPL-MCNC: 9.3 MG/DL (ref 8.7–10.5)
CHLORIDE SERPL-SCNC: 109 MMOL/L (ref 95–110)
CHOLEST SERPL-MCNC: 185 MG/DL (ref 120–199)
CHOLEST/HDLC SERPL: 4.2 {RATIO} (ref 2–5)
CO2 SERPL-SCNC: 24 MMOL/L (ref 23–29)
COMPLEXED PSA SERPL-MCNC: 0.43 NG/ML (ref 0–4)
CREAT SERPL-MCNC: 1 MG/DL (ref 0.5–1.4)
CRP SERPL-MCNC: 0.6 MG/L (ref 0–8.2)
DIFFERENTIAL METHOD BLD: ABNORMAL
EOSINOPHIL # BLD AUTO: 0.4 K/UL (ref 0–0.5)
EOSINOPHIL NFR BLD: 5.5 % (ref 0–8)
ERYTHROCYTE [DISTWIDTH] IN BLOOD BY AUTOMATED COUNT: 11.9 % (ref 11.5–14.5)
ERYTHROCYTE [SEDIMENTATION RATE] IN BLOOD BY PHOTOMETRIC METHOD: 6 MM/HR (ref 0–23)
EST. GFR  (NO RACE VARIABLE): >60 ML/MIN/1.73 M^2
ESTIMATED AVG GLUCOSE: 100 MG/DL (ref 68–131)
GLUCOSE SERPL-MCNC: 101 MG/DL (ref 70–110)
HBA1C MFR BLD: 5.1 % (ref 4–5.6)
HCT VFR BLD AUTO: 43.2 % (ref 40–54)
HDLC SERPL-MCNC: 44 MG/DL (ref 40–75)
HDLC SERPL: 23.8 % (ref 20–50)
HGB BLD-MCNC: 14.4 G/DL (ref 14–18)
IMM GRANULOCYTES # BLD AUTO: 0.04 K/UL (ref 0–0.04)
IMM GRANULOCYTES NFR BLD AUTO: 0.6 % (ref 0–0.5)
LDLC SERPL CALC-MCNC: 126.2 MG/DL (ref 63–159)
LYMPHOCYTES # BLD AUTO: 2.3 K/UL (ref 1–4.8)
LYMPHOCYTES NFR BLD: 33.6 % (ref 18–48)
MCH RBC QN AUTO: 31.7 PG (ref 27–31)
MCHC RBC AUTO-ENTMCNC: 33.3 G/DL (ref 32–36)
MCV RBC AUTO: 95 FL (ref 82–98)
MONOCYTES # BLD AUTO: 0.7 K/UL (ref 0.3–1)
MONOCYTES NFR BLD: 10.7 % (ref 4–15)
NEUTROPHILS # BLD AUTO: 3.3 K/UL (ref 1.8–7.7)
NEUTROPHILS NFR BLD: 49 % (ref 38–73)
NONHDLC SERPL-MCNC: 141 MG/DL
NRBC BLD-RTO: 0 /100 WBC
PLATELET # BLD AUTO: 259 K/UL (ref 150–450)
PMV BLD AUTO: 10 FL (ref 9.2–12.9)
POTASSIUM SERPL-SCNC: 4.4 MMOL/L (ref 3.5–5.1)
PROT SERPL-MCNC: 7.2 G/DL (ref 6–8.4)
RBC # BLD AUTO: 4.54 M/UL (ref 4.6–6.2)
SODIUM SERPL-SCNC: 141 MMOL/L (ref 136–145)
TESTOST SERPL-MCNC: 649 NG/DL (ref 304–1227)
TRIGL SERPL-MCNC: 74 MG/DL (ref 30–150)
TSH SERPL DL<=0.005 MIU/L-ACNC: 2.61 UIU/ML (ref 0.4–4)
WBC # BLD AUTO: 6.7 K/UL (ref 3.9–12.7)

## 2024-10-24 PROCEDURE — 84443 ASSAY THYROID STIM HORMONE: CPT | Performed by: INTERNAL MEDICINE

## 2024-10-24 PROCEDURE — 85025 COMPLETE CBC W/AUTO DIFF WBC: CPT | Performed by: INTERNAL MEDICINE

## 2024-10-24 PROCEDURE — 85652 RBC SED RATE AUTOMATED: CPT | Performed by: INTERNAL MEDICINE

## 2024-10-24 PROCEDURE — 84403 ASSAY OF TOTAL TESTOSTERONE: CPT | Performed by: INTERNAL MEDICINE

## 2024-10-24 PROCEDURE — 86140 C-REACTIVE PROTEIN: CPT | Performed by: INTERNAL MEDICINE

## 2024-10-24 PROCEDURE — 84153 ASSAY OF PSA TOTAL: CPT | Performed by: INTERNAL MEDICINE

## 2024-10-24 PROCEDURE — 80061 LIPID PANEL: CPT | Performed by: INTERNAL MEDICINE

## 2024-10-24 PROCEDURE — 80053 COMPREHEN METABOLIC PANEL: CPT | Performed by: INTERNAL MEDICINE

## 2024-10-24 PROCEDURE — 36415 COLL VENOUS BLD VENIPUNCTURE: CPT | Performed by: INTERNAL MEDICINE

## 2024-10-24 PROCEDURE — 83036 HEMOGLOBIN GLYCOSYLATED A1C: CPT | Performed by: INTERNAL MEDICINE

## 2024-12-05 ENCOUNTER — TELEPHONE (OUTPATIENT)
Dept: ENDOSCOPY | Facility: HOSPITAL | Age: 44
End: 2024-12-05

## 2024-12-05 ENCOUNTER — CLINICAL SUPPORT (OUTPATIENT)
Dept: ENDOSCOPY | Facility: HOSPITAL | Age: 44
End: 2024-12-05
Attending: INTERNAL MEDICINE
Payer: COMMERCIAL

## 2024-12-05 VITALS — WEIGHT: 228 LBS | BODY MASS INDEX: 32.64 KG/M2 | HEIGHT: 70 IN

## 2024-12-05 DIAGNOSIS — Z12.11 ENCOUNTER FOR SCREENING FOR COLORECTAL MALIGNANT NEOPLASM: ICD-10-CM

## 2024-12-05 DIAGNOSIS — Z12.12 ENCOUNTER FOR SCREENING FOR COLORECTAL MALIGNANT NEOPLASM: ICD-10-CM

## 2024-12-05 RX ORDER — SODIUM, POTASSIUM,MAG SULFATES 17.5-3.13G
1 SOLUTION, RECONSTITUTED, ORAL ORAL DAILY
Qty: 1 KIT | Refills: 0 | Status: SHIPPED | OUTPATIENT
Start: 2024-12-05 | End: 2024-12-07

## 2024-12-05 NOTE — TELEPHONE ENCOUNTER
Referral for procedure from PAT appointment      Spoke to patient to schedule procedure(s) Colonoscopy       Physician to perform procedure(s) Dr. RANJEET Ronquillo  Date of Procedure (s) 01/30/25  Arrival Time 11:00 AM  Time of Procedure(s) 12:00 PM   Location of Procedure(s) [place] 4th Floor  Type of Rx Prep sent to patient: Suprep  Instructions provided to patient via [facility]    Patient was informed on the following information and verbalized understanding. Screening questionnaire reviewed with patient and complete. If procedure requires anesthesia, a responsible adult needs to be present to accompany the patient home, patient cannot drive after receiving anesthesia. Appointment details are tentative, especially check-in time. Patient will receive a prep-op call 7 days prior to confirm check-in time for procedure. If applicable the patient should contact their pharmacy to verify Rx for procedure prep is ready for pick-up. Patient was advised to call the scheduling department at [phone] if pharmacy states no Rx is available. Patient was advised to call the endoscopy scheduling department if any questions or concerns arise.      [facility] Scheduling Department

## 2024-12-16 ENCOUNTER — PATIENT MESSAGE (OUTPATIENT)
Dept: ADMINISTRATIVE | Facility: HOSPITAL | Age: 44
End: 2024-12-16
Payer: COMMERCIAL

## 2024-12-16 ENCOUNTER — OFFICE VISIT (OUTPATIENT)
Dept: SURGERY | Facility: CLINIC | Age: 44
End: 2024-12-16
Payer: COMMERCIAL

## 2024-12-16 VITALS
RESPIRATION RATE: 19 BRPM | HEART RATE: 77 BPM | BODY MASS INDEX: 33.68 KG/M2 | WEIGHT: 235.25 LBS | SYSTOLIC BLOOD PRESSURE: 131 MMHG | HEIGHT: 70 IN | DIASTOLIC BLOOD PRESSURE: 74 MMHG

## 2024-12-16 DIAGNOSIS — K64.4 HEMORRHOIDAL SKIN TAGS: Primary | ICD-10-CM

## 2024-12-16 DIAGNOSIS — K64.8 HEMORRHOIDS, INTERNAL, WITH BLEEDING: ICD-10-CM

## 2024-12-16 PROCEDURE — 3075F SYST BP GE 130 - 139MM HG: CPT | Mod: CPTII,S$GLB,, | Performed by: COLON & RECTAL SURGERY

## 2024-12-16 PROCEDURE — 3008F BODY MASS INDEX DOCD: CPT | Mod: CPTII,S$GLB,, | Performed by: COLON & RECTAL SURGERY

## 2024-12-16 PROCEDURE — 3078F DIAST BP <80 MM HG: CPT | Mod: CPTII,S$GLB,, | Performed by: COLON & RECTAL SURGERY

## 2024-12-16 PROCEDURE — 1159F MED LIST DOCD IN RCRD: CPT | Mod: CPTII,S$GLB,, | Performed by: COLON & RECTAL SURGERY

## 2024-12-16 PROCEDURE — 1160F RVW MEDS BY RX/DR IN RCRD: CPT | Mod: CPTII,S$GLB,, | Performed by: COLON & RECTAL SURGERY

## 2024-12-16 PROCEDURE — 99202 OFFICE O/P NEW SF 15 MIN: CPT | Mod: 25,S$GLB,, | Performed by: COLON & RECTAL SURGERY

## 2024-12-16 PROCEDURE — 99999 PR PBB SHADOW E&M-EST. PATIENT-LVL III: CPT | Mod: PBBFAC,,, | Performed by: COLON & RECTAL SURGERY

## 2024-12-16 PROCEDURE — 46600 DIAGNOSTIC ANOSCOPY SPX: CPT | Mod: S$GLB,,, | Performed by: COLON & RECTAL SURGERY

## 2024-12-16 NOTE — LETTER
January 1, 2025      West Castillo MD  [address]  [place] [address]           [facility]  [address]  [facility] [address]  Phone: [phone]  Fax: [phone]          Patient: Juan Noel   MR Number: [id number]   YOB: 1980   Date of Visit: 12/16/2024       Dear Dr. West Castillo:    Thank you for referring Juan Noel to me for evaluation. Attached you will find relevant portions of my assessment and plan of care.    If you have questions, please do not hesitate to call me. I look forward to following Juan Noel along with you.    Sincerely,    Pelon Ronquillo MD    Enclosure  CC:  No Recipients    If you would like to receive this communication electronically, please contact [email or web address] or [phone] to request more information on [facility] access.    For providers and/or their staff who would like to refer a patient to [facility], please contact us through our one-stop-shop provider referral line, [facility], at [phone].    If you feel you have received this communication in error or would no longer like to receive these types of communications, please e-mail [email or web address]

## 2024-12-16 NOTE — PROGRESS NOTES
"Subjective:       Patient ID: Juan Noel is a 44 y.o. male.    Chief Complaint: Hemorrhoids    HPI  43 yo M w intermittent bleeding from hemorrhoids - no significant constipation/straining. C/o intermittent spotting of BRB on toilet tissue when he wipes, occasionally notes some blood dripping into toilet - not a significant amount. Had significant "flare" 1 yr ago treated w topicals.  No abdominal pain, unexplained weight loss, anorexia, recent change in bowel habits, or other constitutional symptoms.     Last colonoscopy - none (scheduled for colonoscopy with me 1/30/25)    No family hx of CRC or IBD.      Review of patient's allergies indicates:  No Known Allergies    Past Medical History:   Diagnosis Date    Abnormal liver function test 09/21/2012    High-density lipoprotein deficiency 06/13/2016    Hyperlipemia 09/21/2012    Hypertension     Sleep apnea 09/21/2012       History reviewed. No pertinent surgical history.    Current Outpatient Medications   Medication Sig Dispense Refill    valsartan (DIOVAN) 160 MG tablet Take 1 tablet (160 mg total) by mouth once daily. 90 tablet 12    buPROPion (WELLBUTRIN XL) 150 MG TB24 tablet Take 1 tablet (150 mg total) by mouth once daily. 30 tablet 11     No current facility-administered medications for this visit.       Family History   Problem Relation Name Age of Onset    No Known Problems Mother      Diabetes Father         Social History     Socioeconomic History    Marital status:    Tobacco Use    Smoking status: Never    Smokeless tobacco: Never   Substance and Sexual Activity    Alcohol use: Yes     Comment: Socially    Drug use: Never    Sexual activity: Yes     Social Drivers of Health     Financial Resource Strain: Low Risk  (10/16/2024)    Overall Financial Resource Strain (CARDIA)     Difficulty of Paying Living Expenses: Not hard at all   Food Insecurity: No Food Insecurity (10/16/2024)    Hunger Vital Sign     Worried About Running Out of " Food in the Last Year: Never true     Ran Out of Food in the Last Year: Never true   Transportation Needs: Unmet Transportation Needs (10/19/2021)    PRAPARE - Transportation     Lack of Transportation (Medical): Yes     Lack of Transportation (Non-Medical): Yes   Physical Activity: Insufficiently Active (10/16/2024)    Exercise Vital Sign     Days of Exercise per Week: 3 days     Minutes of Exercise per Session: 40 min   Stress: No Stress Concern Present (10/16/2024)    [facility] - Occupational Stress Questionnaire     Feeling of Stress : Only a little   Housing Stability: Low Risk  (10/19/2021)    Housing Stability Vital Sign     Unable to Pay for Housing in the Last Year: No     Number of Places Lived in the Last Year: 1     Unstable Housing in the Last Year: No       Review of Systems   Constitutional:  Negative for activity change, appetite change, chills, diaphoresis, fatigue, fever and unexpected weight change.   Gastrointestinal:  Positive for anal bleeding. Negative for abdominal distention, abdominal pain, blood in stool, constipation, diarrhea, nausea, rectal pain and vomiting.   All other systems reviewed and are negative.      Objective:      Physical Exam  Vitals reviewed. Exam conducted with a chaperone present.   Constitutional:       Appearance: He is well-developed.   HENT:      Head: Normocephalic and atraumatic.   Eyes:      Conjunctiva/sclera: Conjunctivae normal.   Pulmonary:      Effort: Pulmonary effort is normal. No respiratory distress.   Abdominal:      General: There is no distension.      Palpations: Abdomen is soft. There is no mass.      Tenderness: There is no abdominal tenderness. There is no guarding or rebound.   Genitourinary:     Comments: Perineum - normal perianal skin, no mass, no fissure, small left lateral external hemorrhoidal tag  RAY - good tone, no mass  Anoscopy - Grade 1 internal hemorrhoids with minimal inflammation    Skin:     General:  Skin is warm and dry.      Findings: No erythema.   Neurological:      Mental Status: He is alert and oriented to person, place, and time.           Lab Results   Component Value Date    WBC 6.70 10/24/2024    HGB 14.4 10/24/2024    HCT 43.2 10/24/2024    MCV 95 10/24/2024     10/24/2024     BMP  Lab Results   Component Value Date     10/24/2024    K 4.4 10/24/2024     10/24/2024    CO2 24 10/24/2024    BUN 21 (H) 10/24/2024    CREATININE 1.0 10/24/2024    CALCIUM 9.3 10/24/2024    ANIONGAP 8 10/24/2024    ESTGFRAFRICA >60 11/10/2021    EGFRNONAA >60 11/10/2021     CMP  Sodium   Date Value Ref Range Status   10/24/2024 141 136 - 145 mmol/L Final     Potassium   Date Value Ref Range Status   10/24/2024 4.4 3.5 - 5.1 mmol/L Final     Chloride   Date Value Ref Range Status   10/24/2024 109 95 - 110 mmol/L Final     CO2   Date Value Ref Range Status   10/24/2024 24 23 - 29 mmol/L Final     Glucose   Date Value Ref Range Status   10/24/2024 101 70 - 110 mg/dL Final     BUN   Date Value Ref Range Status   10/24/2024 21 (H) 6 - 20 mg/dL Final     Creatinine   Date Value Ref Range Status   10/24/2024 1.0 0.5 - 1.4 mg/dL Final     Calcium   Date Value Ref Range Status   10/24/2024 9.3 8.7 - 10.5 mg/dL Final     Total Protein   Date Value Ref Range Status   10/24/2024 7.2 6.0 - 8.4 g/dL Final     Albumin   Date Value Ref Range Status   10/24/2024 4.2 3.5 - 5.2 g/dL Final     Total Bilirubin   Date Value Ref Range Status   10/24/2024 0.4 0.1 - 1.0 mg/dL Final     Comment:     For infants and newborns, interpretation of results should be based  on gestational age, weight and in agreement with clinical  observations.    Premature Infant recommended reference ranges:  Up to 24 hours.............<8.0 mg/dL  Up to 48 hours............<12.0 mg/dL  3-5 days..................<15.0 mg/dL  6-29 days.................<15.0 mg/dL       Alkaline Phosphatase   Date Value Ref Range Status   10/24/2024 47 40 - 150 U/L Final  "    AST   Date Value Ref Range Status   10/24/2024 16 10 - 40 U/L Final     ALT   Date Value Ref Range Status   10/24/2024 27 10 - 44 U/L Final     Anion Gap   Date Value Ref Range Status   10/24/2024 8 8 - 16 mmol/L Final     eGFR if    Date Value Ref Range Status   11/10/2021 >60 >60 mL/min/1.73 m^2 Final     eGFR if non    Date Value Ref Range Status   11/10/2021 >60 >60 mL/min/1.73 m^2 Final     Comment:     Calculation used to obtain the estimated glomerular filtration  rate (eGFR) is the CKD-EPI equation.        No results found for: "CEA"  Lab Results   Component Value Date    CRP 0.6 10/24/2024           Assessment:       1. Hemorrhoidal skin tags    2. Hemorrhoids, internal, with bleeding        Plan:   Discussed pathophysiology of hemorrhoidal disease.  Will start with conservative measures:   -Increased fiber intake (20-25 grams/day) and fluid intake (8-10 glasses water/day)   -Daily fiber supplement   -Soaks/sitz baths   -Avoid excessive trauma/straining if possible   -Avoid sitting on toilet for long periods   -Prep H prn   Will re-evaluate at time of colonoscopy in late Jan - if hemorrhoids are still problematic at that point, can do EBL at same setting.    Pelon Ronquillo MD, FACS, FASCRS  , Department of Colon & Rectal Surgery     This note was created using voice recognition software, and may contain some unrecognized transcriptional errors.     "

## 2025-01-06 ENCOUNTER — PATIENT MESSAGE (OUTPATIENT)
Dept: ENDOSCOPY | Facility: HOSPITAL | Age: 45
End: 2025-01-06
Payer: COMMERCIAL

## 2025-01-08 ENCOUNTER — TELEPHONE (OUTPATIENT)
Dept: FAMILY MEDICINE | Facility: CLINIC | Age: 45
End: 2025-01-08
Payer: COMMERCIAL

## 2025-01-08 DIAGNOSIS — L98.9 FOOT LESION: Primary | ICD-10-CM

## 2025-01-08 NOTE — TELEPHONE ENCOUNTER
----- Message from [employer] sent at 1/8/2025  1:27 PM CST -----  Who Called:self    Referral to What Specialty:podiatry    Reason for Referral:sore on the foot with dark spot (left)    Does the patient want the referral with a specific physician?:    Is the specialist [facility]    Would the patient rather a call back or a response via [facility]?call     Best Call Back Number:[phone]    Additional Information: dr lance buckley

## 2025-01-16 RX ORDER — VALSARTAN 160 MG/1
160 TABLET ORAL DAILY
Qty: 90 TABLET | Refills: 12 | Status: SHIPPED | OUTPATIENT
Start: 2025-01-16 | End: 2028-03-31

## 2025-01-16 NOTE — TELEPHONE ENCOUNTER
No care due was identified.  [facility] Embedded Care Due Messages. Reference number: [id number].   1/15/2025 9:06:43 PM CST

## 2025-01-30 ENCOUNTER — ANESTHESIA (OUTPATIENT)
Dept: ENDOSCOPY | Facility: HOSPITAL | Age: 45
End: 2025-01-30
Payer: COMMERCIAL

## 2025-01-30 ENCOUNTER — HOSPITAL ENCOUNTER (OUTPATIENT)
Facility: HOSPITAL | Age: 45
Discharge: HOME | End: 2025-01-30
Attending: COLON & RECTAL SURGERY | Admitting: COLON & RECTAL SURGERY
Payer: COMMERCIAL

## 2025-01-30 ENCOUNTER — ANESTHESIA EVENT (OUTPATIENT)
Dept: ENDOSCOPY | Facility: HOSPITAL | Age: 45
End: 2025-01-30
Payer: COMMERCIAL

## 2025-01-30 VITALS
DIASTOLIC BLOOD PRESSURE: 53 MMHG | HEIGHT: 70 IN | WEIGHT: 228 LBS | BODY MASS INDEX: 32.64 KG/M2 | RESPIRATION RATE: 16 BRPM | TEMPERATURE: 98 F | OXYGEN SATURATION: 98 % | SYSTOLIC BLOOD PRESSURE: 128 MMHG | HEART RATE: 77 BPM

## 2025-01-30 DIAGNOSIS — Z12.11 SCREENING FOR COLON CANCER: ICD-10-CM

## 2025-01-30 PROCEDURE — 37000009 HC ANESTHESIA EA ADD 15 MINS: Performed by: COLON & RECTAL SURGERY

## 2025-01-30 PROCEDURE — 37000008 HC ANESTHESIA 1ST 15 MINUTES: Performed by: COLON & RECTAL SURGERY

## 2025-01-30 PROCEDURE — 88305 TISSUE EXAM BY PATHOLOGIST: CPT | Performed by: PATHOLOGY

## 2025-01-30 PROCEDURE — 45380 COLONOSCOPY AND BIOPSY: CPT | Mod: 33,,, | Performed by: COLON & RECTAL SURGERY

## 2025-01-30 PROCEDURE — 27201012 HC FORCEPS, HOT/COLD, DISP: Performed by: COLON & RECTAL SURGERY

## 2025-01-30 PROCEDURE — 45380 COLONOSCOPY AND BIOPSY: CPT | Mod: 33 | Performed by: COLON & RECTAL SURGERY

## 2025-01-30 PROCEDURE — 63600175 PHARM REV CODE 636 W HCPCS

## 2025-01-30 PROCEDURE — 88305 TISSUE EXAM BY PATHOLOGIST: CPT | Mod: 26,,, | Performed by: PATHOLOGY

## 2025-01-30 RX ORDER — PROPOFOL 10 MG/ML
VIAL (ML) INTRAVENOUS
Status: DISCONTINUED | OUTPATIENT
Start: 2025-01-30 | End: 2025-01-30

## 2025-01-30 RX ORDER — LIDOCAINE HYDROCHLORIDE 20 MG/ML
INJECTION INTRAVENOUS
Status: DISCONTINUED | OUTPATIENT
Start: 2025-01-30 | End: 2025-01-30

## 2025-01-30 RX ORDER — SODIUM CHLORIDE 9 MG/ML
INJECTION, SOLUTION INTRAVENOUS CONTINUOUS
Status: DISCONTINUED | OUTPATIENT
Start: 2025-01-30 | End: 2025-01-30 | Stop reason: HOSPADM

## 2025-01-30 RX ADMIN — PROPOFOL 100 MG: 10 INJECTION, EMULSION INTRAVENOUS at 12:01

## 2025-01-30 RX ADMIN — PROPOFOL 150 MCG/KG/MIN: 10 INJECTION, EMULSION INTRAVENOUS at 12:01

## 2025-01-30 RX ADMIN — LIDOCAINE HYDROCHLORIDE 100 MG: 20 INJECTION INTRAVENOUS at 12:01

## 2025-01-30 RX ADMIN — PROPOFOL 30 MG: 10 INJECTION, EMULSION INTRAVENOUS at 12:01

## 2025-01-30 RX ADMIN — PROPOFOL 70 MG: 10 INJECTION, EMULSION INTRAVENOUS at 12:01

## 2025-01-30 NOTE — H&P
"    Colonoscopy History & Physical    SUBJECTIVE:     PROCEDURE: Colonoscopy    Indications: rectal bleeding and hemorrhoids    Family history of colorectal cancer: none    Last Colonoscopy: none    Anticoagulation use: none    Review of patient's allergies indicates:  No Known Allergies    Current Facility-Administered Medications   Medication Dose Route Frequency Provider Last Rate Last Admin    0.9% NaCl infusion   Intravenous Continuous Pelon Ronquillo MD           Past Medical History:   Diagnosis Date    Abnormal liver function test 09/21/2012    High-density lipoprotein deficiency 06/13/2016    Hyperlipemia 09/21/2012    Hypertension     Sleep apnea 09/21/2012     History reviewed. No pertinent surgical history.  Family History   Problem Relation Name Age of Onset    No Known Problems Mother      Diabetes Father       Social History     Tobacco Use    Smoking status: Never    Smokeless tobacco: Never   Substance Use Topics    Alcohol use: Yes     Comment: Socially    Drug use: Never        Review of Systems:  Review of Systems    OBJECTIVE:     Vital Signs (Most Recent)  Temp: 98.6 °F (37 °C) (01/30/25 1040)  Pulse: 78 (01/30/25 1040)  Resp: 16 (01/30/25 1040)  BP: 135/76 (01/30/25 1040)  SpO2: 98 % (01/30/25 1040)  5' 10" (1.778 m)  103.4 kg (228 lb)     Physical Exam  Constitutional:       General: He is not in acute distress.  HENT:      Mouth/Throat:      Mouth: Mucous membranes are moist.   Eyes:      Extraocular Movements: Extraocular movements intact.      Conjunctiva/sclera: Conjunctivae normal.   Cardiovascular:      Rate and Rhythm: Normal rate.      Pulses: Normal pulses.   Pulmonary:      Effort: Pulmonary effort is normal. No respiratory distress.   Skin:     General: Skin is warm and dry.   Neurological:      General: No focal deficit present.      Mental Status: He is alert.   Psychiatric:         Mood and Affect: Mood normal.         Behavior: Behavior normal.         ASA:  " II    PLAN  COLONOSCOPY  Sedation Plan: MAC    The details of the procedure, the possible need for biopsy or polypectomy and the potential risks including bleeding, perforation, missed polyps were discussed in detail.    Myles Pop MD  General Surgery PGY-1

## 2025-01-30 NOTE — TRANSFER OF CARE
"Anesthesia Transfer of Care Note    Patient: Juan Noel    Procedure(s) Performed: Procedure(s) (LRB):  COLONOSCOPY, SCREENING, LOW RISK PATIENT (N/A)    Patient location: [facility]    Anesthesia Type: general    Transport from OR: Transported from OR on 6-10 L/min O2 by face mask with adequate spontaneous ventilation    Post pain: adequate analgesia    Post assessment: no apparent anesthetic complications and tolerated procedure well    Post vital signs: stable    Level of consciousness: responds to stimulation    Nausea/Vomiting: no nausea/vomiting    Complications: none    Transfer of care protocol was followed      Last vitals: Visit Vitals  /76 (BP Location: Left arm, Patient Position: Lying)   Pulse 78   Temp 37 °C (98.6 °F) (Tympanic)   Resp 16   Ht 5' 10" (1.778 m)   Wt 103.4 kg (228 lb)   SpO2 98%   BMI 32.71 kg/m²     "

## 2025-01-30 NOTE — PLAN OF CARE
Discharge instructions and Provation report reviewed with patient at bedside. Questions/concerns addressed. Verbalized understanding of instructions.

## 2025-01-30 NOTE — PROVATION PATIENT INSTRUCTIONS
Discharge Summary/Instructions after an Endoscopic Procedure  Patient Name: Juan Noel  Patient MRN: [id number]  Patient   YOB: 1980 Thursday, January 30, 2025  Pelon Ronquillo MD  Dear patient,  As a result of recent federal legislation (The Federal Cures Act), you may   receive lab or pathology results from your procedure in your [facility]   account before your physician is able to contact you. Your physician or   their representative will relay the results to you with their   recommendations at their soonest availability.  Thank you,  RESTRICTIONS:  During your procedure today, you received medications for sedation.  These   medications may affect your judgment, balance and coordination.  Therefore,   for 24 hours, you have the following restrictions:   - DO NOT drive a car, operate machinery, make legal/financial decisions,   sign important papers or drink alcohol.    ACTIVITY:  Today: no heavy lifting, straining or running due to procedural   sedation/anesthesia.  The following day: return to full activity including work.  DIET:  Eat and drink normally unless instructed otherwise.     TREATMENT FOR COMMON SIDE EFFECTS:  - Mild abdominal pain, nausea, belching, bloating or excessive gas:  rest,   eat lightly and use a heating pad.  - Sore Throat: treat with throat lozenges and/or gargle with warm salt   water.  - Because air was used during the procedure, expelling large amounts of air   from your rectum or belching is normal.  - If a bowel prep was taken, you may not have a bowel movement for 1-3 days.    This is normal.  SYMPTOMS TO WATCH FOR AND REPORT TO YOUR PHYSICIAN:  1. Abdominal pain or bloating, other than gas cramps.  2. Chest pain.  3. Back pain.  4. Signs of infection such as: chills or fever occurring within 24 hours   after the procedure.  5. Rectal bleeding, which would show as bright red, maroon, or black stools.   (A tablespoon of blood from the rectum is not serious,  especially if   hemorrhoids are present.)  6. Vomiting.  7. Weakness or dizziness.  GO DIRECTLY TO THE NEAREST EMERGENCY ROOM IF YOU HAVE ANY OF THE FOLLOWING:      Difficulty breathing              Chills and/or fever over 101 F   Persistent vomiting and/or vomiting blood   Severe abdominal pain   Severe chest pain   Black, tarry stools   Bleeding- more than one tablespoon   Any other symptom or condition that you feel may need urgent attention  Your doctor recommends these additional instructions:  If any biopsies were taken, your doctors clinic will contact you in 1 to 2   weeks with any results.  - Discharge patient to home.   - High fiber diet.   - Continue present medications.   - Await pathology results.   - Repeat colonoscopy date to be determined after pending pathology results   are reviewed for surveillance.   - Patient has a contact number available for emergencies.  The signs and   symptoms of potential delayed complications were discussed with the   patient.  Return to normal activities tomorrow.  Written discharge   instructions were provided to the patient.  For questions, problems or results please call your physician - Pelon Ronquillo MD at Work:  [phone].  [facility], EMERGENCY ROOM PHONE NUMBER: [phone]  IF A COMPLICATION OR EMERGENCY SITUATION ARISES AND YOU ARE UNABLE TO REACH   YOUR PHYSICIAN - GO DIRECTLY TO THE EMERGENCY ROOM.  Pelon Ronquillo MD  1/30/2025 12:58:15 PM  This report has been verified and signed electronically.  Dear patient,  As a result of recent federal legislation (The Federal Cures Act), you may   receive lab or pathology results from your procedure in your [facility]   account before your physician is able to contact you. Your physician or   their representative will relay the results to you with their   recommendations at their soonest availability.  Thank you,  [employer]ATION

## 2025-01-30 NOTE — ANESTHESIA PREPROCEDURE EVALUATION
01/30/2025  Juan Noel is a 44 y.o., male.    Past Medical History:   Diagnosis Date    Abnormal liver function test 09/21/2012    High-density lipoprotein deficiency 06/13/2016    Hyperlipemia 09/21/2012    Hypertension     Sleep apnea 09/21/2012     History reviewed. No pertinent surgical history.  Patient Active Problem List   Diagnosis    Sleep apnea    Abnormal liver function test    Hyperlipemia    Routine medical exam    High-density lipoprotein deficiency     No current facility-administered medications on file prior to encounter.     No current outpatient medications on file prior to encounter.         Pre-op Assessment    I have reviewed the Patient Summary Reports.     I have reviewed the Nursing Notes. I have reviewed the NPO Status.   I have reviewed the Medications.     Review of Systems  Anesthesia Hx:  No problems with previous Anesthesia   Neg history of prior surgery.          Denies Family Hx of Anesthesia complications.    Denies Personal Hx of Anesthesia complications.                    Hematology/Oncology:  Hematology Normal   Oncology Normal                                   EENT/Dental:  EENT/Dental Normal           Cardiovascular:     Hypertension                                          Pulmonary:        Sleep Apnea                Renal/:  Renal/ Normal                 Hepatic/GI:  Hepatic/GI Normal                    Musculoskeletal:  Musculoskeletal Normal                Neurological:  Neurology Normal                                      Endocrine:  Endocrine Normal            Dermatological:  Skin Normal    Psych:  Psychiatric Normal                    Physical Exam  General: Well nourished, Cooperative, Alert and Oriented    Airway:  Mallampati: II   Mouth Opening: Normal  TM Distance: Normal  Tongue: Normal  Neck ROM: Normal  ROM    Dental:  Intact        Anesthesia Plan  Type of Anesthesia, risks & benefits discussed:    Anesthesia Type: Gen Natural Airway  Intra-op Monitoring Plan: Standard ASA Monitors  Post Op Pain Control Plan: multimodal analgesia and IV/PO Opioids PRN  Induction:  IV  Informed Consent: Informed consent signed with the Patient and all parties understand the risks and agree with anesthesia plan.  All questions answered. Patient consented to blood products? No  ASA Score: 1  Day of Surgery Review of History & Physical: H&P Update referred to the surgeon/provider.    Ready For Surgery From Anesthesia Perspective.     .

## 2025-01-30 NOTE — ANESTHESIA POSTPROCEDURE EVALUATION
Anesthesia Post Evaluation    Patient: Juan Noel    Procedure(s) Performed: Procedure(s) (LRB):  COLONOSCOPY, SCREENING, LOW RISK PATIENT (N/A)    Final Anesthesia Type: general      Patient location during evaluation: PACU  Patient participation: Yes- Able to Participate  Level of consciousness: awake and alert and oriented  Post-procedure vital signs: reviewed and stable  Pain management: adequate  Airway patency: patent    PONV status at discharge: No PONV  Anesthetic complications: no      Cardiovascular status: hemodynamically stable  Respiratory status: unassisted  Hydration status: euvolemic  Follow-up not needed.              Vitals Value Taken Time   /53 01/30/25 1330   Temp 36.5 °C (97.7 °F) 01/30/25 1300   Pulse 77 01/30/25 1330   Resp 16 01/30/25 1330   SpO2 98 % 01/30/25 1330         Event Time   Out of Recovery 13:49:10         Pain/Reji Score: Reji Score: 10 (1/30/2025  1:15 PM)

## 2025-02-03 LAB
FINAL PATHOLOGIC DIAGNOSIS: NORMAL
GROSS: NORMAL
Lab: NORMAL

## 2025-04-30 ENCOUNTER — PATIENT MESSAGE (OUTPATIENT)
Dept: FAMILY MEDICINE | Facility: CLINIC | Age: 45
End: 2025-04-30
Payer: COMMERCIAL

## 2025-06-13 ENCOUNTER — PATIENT MESSAGE (OUTPATIENT)
Dept: FAMILY MEDICINE | Facility: CLINIC | Age: 45
End: 2025-06-13
Payer: COMMERCIAL

## 2025-06-27 ENCOUNTER — PATIENT MESSAGE (OUTPATIENT)
Dept: FAMILY MEDICINE | Facility: CLINIC | Age: 45
End: 2025-06-27
Payer: COMMERCIAL

## 2025-08-21 ENCOUNTER — OFFICE VISIT (OUTPATIENT)
Dept: PODIATRY | Facility: CLINIC | Age: 45
End: 2025-08-21
Payer: COMMERCIAL

## 2025-08-21 ENCOUNTER — PATIENT MESSAGE (OUTPATIENT)
Dept: PODIATRY | Facility: CLINIC | Age: 45
End: 2025-08-21

## 2025-08-21 VITALS
DIASTOLIC BLOOD PRESSURE: 77 MMHG | WEIGHT: 227.94 LBS | HEART RATE: 73 BPM | HEIGHT: 70 IN | BODY MASS INDEX: 32.63 KG/M2 | SYSTOLIC BLOOD PRESSURE: 127 MMHG

## 2025-08-21 DIAGNOSIS — S90.212A CONTUSION OF LEFT GREAT TOE WITH DAMAGE TO NAIL, INITIAL ENCOUNTER: Primary | ICD-10-CM

## 2025-08-21 DIAGNOSIS — B35.1 ONYCHOMYCOSIS DUE TO DERMATOPHYTE: ICD-10-CM

## 2025-08-21 PROCEDURE — 3008F BODY MASS INDEX DOCD: CPT | Mod: CPTII,S$GLB,, | Performed by: PODIATRIST

## 2025-08-21 PROCEDURE — 3074F SYST BP LT 130 MM HG: CPT | Mod: CPTII,S$GLB,, | Performed by: PODIATRIST

## 2025-08-21 PROCEDURE — 99204 OFFICE O/P NEW MOD 45 MIN: CPT | Mod: S$GLB,,, | Performed by: PODIATRIST

## 2025-08-21 PROCEDURE — 3078F DIAST BP <80 MM HG: CPT | Mod: CPTII,S$GLB,, | Performed by: PODIATRIST

## 2025-08-21 PROCEDURE — 99999 PR PBB SHADOW E&M-EST. PATIENT-LVL III: CPT | Mod: PBBFAC,,, | Performed by: PODIATRIST

## 2025-08-21 PROCEDURE — 4010F ACE/ARB THERAPY RXD/TAKEN: CPT | Mod: CPTII,S$GLB,, | Performed by: PODIATRIST

## 2025-08-21 PROCEDURE — 1159F MED LIST DOCD IN RCRD: CPT | Mod: CPTII,S$GLB,, | Performed by: PODIATRIST

## 2025-08-21 RX ORDER — CICLOPIROX OLAMINE 7.7 MG/G
CREAM TOPICAL 2 TIMES DAILY
Qty: 90 G | Refills: 2 | Status: SHIPPED | OUTPATIENT
Start: 2025-08-21